# Patient Record
Sex: FEMALE | Race: BLACK OR AFRICAN AMERICAN | Employment: FULL TIME | URBAN - METROPOLITAN AREA
[De-identification: names, ages, dates, MRNs, and addresses within clinical notes are randomized per-mention and may not be internally consistent; named-entity substitution may affect disease eponyms.]

---

## 2017-03-15 PROBLEM — F32.A DEPRESSION: Status: ACTIVE | Noted: 2017-02-01

## 2017-12-28 PROBLEM — F33.9 RECURRENT DEPRESSION (HCC): Status: RESOLVED | Noted: 2017-12-28 | Resolved: 2017-12-28

## 2017-12-28 PROBLEM — F32.A DEPRESSION: Status: RESOLVED | Noted: 2017-02-01 | Resolved: 2017-12-28

## 2017-12-28 PROBLEM — F33.9 RECURRENT DEPRESSION (HCC): Status: ACTIVE | Noted: 2017-12-28

## 2018-07-05 ENCOUNTER — ANESTHESIA EVENT (OUTPATIENT)
Dept: ENDOSCOPY | Age: 53
End: 2018-07-05
Payer: COMMERCIAL

## 2018-07-05 RX ORDER — SODIUM CHLORIDE, SODIUM LACTATE, POTASSIUM CHLORIDE, CALCIUM CHLORIDE 600; 310; 30; 20 MG/100ML; MG/100ML; MG/100ML; MG/100ML
100 INJECTION, SOLUTION INTRAVENOUS CONTINUOUS
Status: CANCELLED | OUTPATIENT
Start: 2018-07-05

## 2018-07-05 RX ORDER — SODIUM CHLORIDE 0.9 % (FLUSH) 0.9 %
5-10 SYRINGE (ML) INJECTION AS NEEDED
Status: CANCELLED | OUTPATIENT
Start: 2018-07-05

## 2018-07-06 ENCOUNTER — ANESTHESIA (OUTPATIENT)
Dept: ENDOSCOPY | Age: 53
End: 2018-07-06
Payer: COMMERCIAL

## 2018-07-06 ENCOUNTER — HOSPITAL ENCOUNTER (OUTPATIENT)
Age: 53
Setting detail: OUTPATIENT SURGERY
Discharge: HOME | End: 2018-07-06
Attending: SURGERY | Admitting: SURGERY
Payer: COMMERCIAL

## 2018-07-06 VITALS
HEIGHT: 68 IN | DIASTOLIC BLOOD PRESSURE: 72 MMHG | HEART RATE: 88 BPM | OXYGEN SATURATION: 98 % | RESPIRATION RATE: 20 BRPM | SYSTOLIC BLOOD PRESSURE: 136 MMHG | BODY MASS INDEX: 25.01 KG/M2 | WEIGHT: 165 LBS | TEMPERATURE: 98.1 F

## 2018-07-06 PROCEDURE — 74011000250 HC RX REV CODE- 250: Performed by: ANESTHESIOLOGY

## 2018-07-06 PROCEDURE — 77030013991 HC SNR POLYP ENDOSC BSC -A: Performed by: SURGERY

## 2018-07-06 PROCEDURE — 74011250636 HC RX REV CODE- 250/636

## 2018-07-06 PROCEDURE — 77030011640 HC PAD GRND REM COVD -A: Performed by: SURGERY

## 2018-07-06 PROCEDURE — 88305 TISSUE EXAM BY PATHOLOGIST: CPT | Performed by: SURGERY

## 2018-07-06 PROCEDURE — 76060000032 HC ANESTHESIA 0.5 TO 1 HR: Performed by: SURGERY

## 2018-07-06 PROCEDURE — 74011000250 HC RX REV CODE- 250

## 2018-07-06 PROCEDURE — 74011250636 HC RX REV CODE- 250/636: Performed by: ANESTHESIOLOGY

## 2018-07-06 PROCEDURE — 76040000026: Performed by: SURGERY

## 2018-07-06 RX ORDER — PROPOFOL 10 MG/ML
INJECTION, EMULSION INTRAVENOUS
Status: DISCONTINUED | OUTPATIENT
Start: 2018-07-06 | End: 2018-07-06 | Stop reason: HOSPADM

## 2018-07-06 RX ORDER — PROPOFOL 10 MG/ML
INJECTION, EMULSION INTRAVENOUS AS NEEDED
Status: DISCONTINUED | OUTPATIENT
Start: 2018-07-06 | End: 2018-07-06 | Stop reason: HOSPADM

## 2018-07-06 RX ORDER — SODIUM CHLORIDE 9 MG/ML
10 INJECTION, SOLUTION INTRAVENOUS CONTINUOUS
Status: DISCONTINUED | OUTPATIENT
Start: 2018-07-06 | End: 2018-07-06 | Stop reason: HOSPADM

## 2018-07-06 RX ORDER — LIDOCAINE HYDROCHLORIDE 20 MG/ML
INJECTION, SOLUTION EPIDURAL; INFILTRATION; INTRACAUDAL; PERINEURAL AS NEEDED
Status: DISCONTINUED | OUTPATIENT
Start: 2018-07-06 | End: 2018-07-06 | Stop reason: HOSPADM

## 2018-07-06 RX ORDER — SODIUM CHLORIDE, SODIUM LACTATE, POTASSIUM CHLORIDE, CALCIUM CHLORIDE 600; 310; 30; 20 MG/100ML; MG/100ML; MG/100ML; MG/100ML
100 INJECTION, SOLUTION INTRAVENOUS CONTINUOUS
Status: DISCONTINUED | OUTPATIENT
Start: 2018-07-06 | End: 2018-07-06 | Stop reason: HOSPADM

## 2018-07-06 RX ADMIN — LIDOCAINE HYDROCHLORIDE 40 MG: 20 INJECTION, SOLUTION EPIDURAL; INFILTRATION; INTRACAUDAL; PERINEURAL at 09:10

## 2018-07-06 RX ADMIN — SODIUM CHLORIDE, SODIUM LACTATE, POTASSIUM CHLORIDE, AND CALCIUM CHLORIDE 100 ML/HR: 600; 310; 30; 20 INJECTION, SOLUTION INTRAVENOUS at 08:53

## 2018-07-06 RX ADMIN — PROPOFOL 200 MCG/KG/MIN: 10 INJECTION, EMULSION INTRAVENOUS at 09:00

## 2018-07-06 RX ADMIN — FAMOTIDINE 20 MG: 10 INJECTION, SOLUTION INTRAVENOUS at 08:57

## 2018-07-06 RX ADMIN — PROPOFOL 60 MG: 10 INJECTION, EMULSION INTRAVENOUS at 09:11

## 2018-07-06 NOTE — DISCHARGE INSTRUCTIONS
Gastrointestinal Colonoscopy/Flexible Sigmoidoscopy - Lower Exam Discharge Instructions  1. Call Dr. Bhavik Grimes for any problems or questions. 2. Contact the doctors office for follow up appointment as directed  3. Medication may cause drowsiness for several hours, therefore, do not drive or operate machinery for remainder of the day. 4. No alcohol today. 5. Ordinarily, you may resume regular diet and activity after exam unless otherwise specified by your physician. 6. Because of air put into your colon during exam, you may experience some abdominal distension, relieved by the passage of gas, for several hours. 7. Contact your physician if you have any of the following:  a. Excessive amount of bleeding - large amount when having a bowel movement. Occasional specks of blood with bowel movement would not be unusual.  b. Severe abdominal pain  c. Fever or Chills  8. Polyp Removal -   a. No Aspirin, Advil, Aleve, Nuprin, Ibuprofen, or medications that contain these drugs for 2 weeks. Any additional instructions: Follow up with polyp results      Instructions given to Maya Pickett and other family members.

## 2018-07-06 NOTE — IP AVS SNAPSHOT
[facility] 
 
 
 [address] [facility] [address] [facility] 
[phone] Patient: Valerie Brower MRN: [id number] :1965 About your hospitalization You were admitted on:  2018 You last received care in the:  [facility] You were discharged on:  2018 Why you were hospitalized Your primary diagnosis was:  Not on File Follow-up Information None Your Scheduled Appointments 2018  8:15 AM EDT  
LAB with [facility] [place] LAB RESOURCE [address] [facility] ([facility] [place] [address] [facility]) [address] [address]  
[phone] Discharge Orders None A check babar indicates which time of day the medication should be taken. My Medications ASK your doctor about these medications Instructions Each Dose to Equal  
 Morning Noon Evening Bedtime  
 albuterol 90 mcg/actuation inhaler Commonly known as:  PROAIR HFA Your last dose was: Your next dose is:    
   
   
 2 puffs qid prn  
     
   
   
   
  
 clindamycin 1 % external solution Commonly known as:  CLEOCIN T Your last dose was: Your next dose is:    
   
   
 use thin film on affected area  
     
   
   
   
  
 eflornithine 13.9 % topical cream  
Commonly known as:  "[employer]" Your last dose was: Your next dose is:    
   
   
 Apply 1 Each to affected area two (2) times a day. apply thin layer to affected areas space application by 8 hour 1 Each  
    
   
   
   
  
 fluticasone 50 mcg/actuation nasal spray Commonly known as:  [employer] Your last dose was: Your next dose is:    
   
   
 1 Spray by Both Nostrils route daily. 1 Spray  
    
   
   
   
  
 fluticasone-vilanterol 200-25 mcg/dose inhaler Commonly known as:  BREO ELLIPTA Your last dose was: Your next dose is: 1 inhalation daily, rinse mouth after use  
     
   
   
   
  
 montelukast 10 mg tablet Commonly known as:  SINGULAIR Your last dose was: Your next dose is:    
   
   
 1 po q hs  
     
   
   
   
  
 venlafaxine 37.5 mg tablet Commonly known as:  [facility] Your last dose was: Your next dose is: Take 1 Tab by mouth daily. Indications: VASOMOTOR SYMPTOMS ASSOCIATED WITH MENOPAUSE  
 37.5 mg  
    
   
   
   
  
  
  
  
Discharge Instructions Gastrointestinal Colonoscopy/Flexible Sigmoidoscopy - Lower Exam Discharge Instructions 1. Call Dr. Sherron Bolanos for any problems or questions. 2. Contact the doctors office for follow up appointment as directed 3. Medication may cause drowsiness for several hours, therefore, do not drive or operate machinery for remainder of the day. 4. No alcohol today. 5. Ordinarily, you may resume regular diet and activity after exam unless otherwise specified by your physician. 6. Because of air put into your colon during exam, you may experience some abdominal distension, relieved by the passage of gas, for several hours. 7. Contact your physician if you have any of the following: 
a. Excessive amount of bleeding  large amount when having a bowel movement. Occasional specks of blood with bowel movement would not be unusual. 
b. Severe abdominal pain 
c. Fever or Chills 8. Polyp Removal   
a. No Aspirin, Advil, Aleve, Nuprin, Ibuprofen, or medications that contain these drugs for 2 weeks. Any additional instructions: Follow up with polyp results Instructions given to Xiomara Wilton and other family members. Introducing [facility]! [facility] introduces [employer] patient portal. Now you can access parts of your medical record, email your doctor's office, and request medication refills online. 1. In your internet browser, go to [email or web address] 2. Click on the First Time User? Click Here link in the Sign In box. You will see the New Member Sign Up page. 3. Enter your [employer] Access Code exactly as it appears below. You will not need to use this code after youve completed the sign-up process. If you do not sign up before the expiration date, you must request a new code. · [employer] Access Code: [email or web address] Expires: 10/1/2018  8:22 AM 
 
4. Enter the last four digits of your Social Security Number (xxxx) and Date of Birth (mm/dd/yyyy) as indicated and click Submit. You will be taken to the next sign-up page. 5. Create a [employer] ID. This will be your [employer] login ID and cannot be changed, so think of one that is secure and easy to remember. 6. Create a [employer] password. You can change your password at any time. 7. Enter your Password Reset Question and Answer. This can be used at a later time if you forget your password. 8. Enter your e-mail address. You will receive e-mail notification when new information is available in [address] 9. Click Sign Up. You can now view and download portions of your medical record. 10. Click the Download Summary menu link to download a portable copy of your medical information. If you have questions, please visit the Frequently Asked Questions section of the [employer] website. Remember, [employer] is NOT to be used for urgent needs. For medical emergencies, dial 911. Now available from your iPhone and Android! Introducing Adolfo Farrell As a [facility] patient, I wanted to make you aware of our electronic visit tool called Adolfo Farrell. [facility] 24/7 allows you to connect within minutes with a medical provider 24 hours a day, seven days a week via a mobile device or tablet or logging into a secure website from your computer. You can access Adolfo Farrell from anywhere in the [place].  
 
A virtual visit might be right for you when you have a simple condition and feel like you just dont want to get out of bed, or cant get away from work for an appointment, when your regular [facility] provider is not available (evenings, weekends or holidays), or when youre out of town and need minor care. Electronic visits cost only $49 and if the [employer] [facility] 24/7 provider determines a prescription is needed to treat your condition, one can be electronically transmitted to a nearby pharmacy*. Please take a moment to enroll today if you have not already done so. The enrollment process is free and takes just a few minutes. To enroll, please download the [employer] kayleen to your tablet or phone, or visit [email or web address] to enroll on your computer. And, as an [address] [place] [address] patient with a [employer] account, the results of your visits will be scanned into your electronic medical record and your primary care provider will be able to view the scanned results. We urge you to continue to see your regular [facility] provider for your ongoing medical care. And while your primary care provider may not be the one available when you seek a [employer] On[employer] virtual visit, the peace of mind you get from getting a real diagnosis real time can be priceless. For more information on Adolfo Ajalinetana, view our Frequently Asked Questions (FAQs) at [email or web address]. Sincerely, 
 
Ashlie Ware MD 
Chief Medical Officer [employer] *:  certain medications cannot be prescribed via Adolfo Ajalinetana Providers Seen During Your Hospitalization Provider Specialty Primary office phone Casi Osman MD General Surgery [phone] Your Primary Care Physician (PCP) Primary Care Physician Office Phone Office Fax Via Stuart Mendoza [address] [place] [address] [phone] You are allergic to the following No active allergies Recent Documentation Height Weight Breastfeeding? BMI OB Status Smoking Status 1.727 m 74.8 kg No 25.09 kg/m2 Hysterectomy Never Smoker Emergency Contacts Name Discharge Info Relation Home Work Mobile Sagar Shore  Spouse [0] [phone] Randa Roy  Mother [14]   [phone] Patient Belongings The following personal items are in your possession at time of discharge: 
  Dental Appliances: None  Visual Aid: Contacts Please provide this summary of care documentation to your next provider. Signatures-by signing, you are acknowledging that this After Visit Summary has been reviewed with you and you have received a copy. Patient Signature:  ____________________________________________________________ Date:  ____________________________________________________________  
  
[facility] Provider Signature:  ____________________________________________________________ Date:  ____________________________________________________________

## 2018-07-06 NOTE — ANESTHESIA PREPROCEDURE EVALUATION
Anesthetic History   No history of anesthetic complications            Review of Systems / Medical History  Patient summary reviewed and pertinent labs reviewed    Pulmonary            Asthma : well controlled       Neuro/Psych   Within defined limits           Cardiovascular    Hypertension: well controlled              Exercise tolerance: >4 METS     GI/Hepatic/Renal  Within defined limits              Endo/Other  Within defined limits           Other Findings   Comments: depression           Physical Exam    Airway  Mallampati: II  TM Distance: 4 - 6 cm  Neck ROM: normal range of motion   Mouth opening: Normal     Cardiovascular  Regular rate and rhythm,  S1 and S2 normal,  no murmur, click, rub, or gallop  Rhythm: regular  Rate: normal         Dental  No notable dental hx       Pulmonary  Breath sounds clear to auscultation               Abdominal  GI exam deferred       Other Findings            Anesthetic Plan    ASA: 2  Anesthesia type: total IV anesthesia          Induction: Intravenous  Anesthetic plan and risks discussed with: Patient

## 2018-07-06 NOTE — H&P
Colonoscopy History and Physical      Patient: Katarzyna Cruz    Physician: Yobani Tineo MD    Referring Physician: Bossman Chadwick MD    Chief Complaint: For colonoscopy    History of Present Illness: Pt presents for colonoscopy. Initial screening study; did have colo 25 or so years ago. History:  Past Medical History:   Diagnosis Date    Asthma     Depression 02/2017    Hives     Hx of esophagogastroduodenoscopy 2007    secondary to dysphagia    Hypertension     no medication at this time    Reactive airway disease     Urticaria      Past Surgical History:   Procedure Laterality Date    HX HYSTERECTOMY  2012    HX TONSILLECTOMY  1973      Social History     Social History    Marital status:      Spouse name: N/A    Number of children: N/A    Years of education: N/A     Social History Main Topics    Smoking status: Never Smoker    Smokeless tobacco: Never Used    Alcohol use 0.0 oz/week     0 Standard drinks or equivalent per week      Comment: occasional     Drug use: None    Sexual activity: Not Asked     Other Topics Concern    None     Social History Narrative    No known toxic industrial or environmental exposure. She has lived in [place] and [place]. She is employed as a teacher in [place]. She is  and resides in [place] Family History   Problem Relation Age of Onset    Lung Disease Neg Hx        Medications:   Prior to Admission medications    Medication Sig Start Date End Date Taking?  Authorizing Provider   fluticasone-vilanterol (BREO ELLIPTA) 200-25 mcg/dose inhaler 1 inhalation daily, rinse mouth after use  Patient taking differently: 1 inhalation daily, rinse mouth after use-not currently using-7-3-18 7/3/18  Yes Marsha Husain NP   clindamycin (CLEOCIN T) 1 % external solution use thin film on affected area 7/3/18  Yes Marsha Husain NP   fluticasone (FLONASE) 50 mcg/actuation nasal spray 1 Spray by Both Nostrils route daily. 7/3/18  Yes Sadia Cruz NP   [facility]erol [facility]) 90 mcg/actuation inhaler 2 puffs qid prn 7/3/18  Yes Sadia Cruz NP   [facility]) 13.9 % topical cream Apply 1 Each to affected area two (2) times a day. apply thin layer to affected areas space application by 8 hour 6/9/17  Yes Sadia Cruz NP   montelukast (SINGULAIR) 10 mg tablet 1 po q hs  Patient taking differently: Take 10 mg by mouth nightly. 1 po q hs 7/3/18  Yes Sadia Cruz NP   venlafaxine [facility]) 37.5 mg tablet Take 1 Tab by mouth daily. Indications: VASOMOTOR SYMPTOMS ASSOCIATED WITH MENOPAUSE  Patient taking differently: Take 37.5 mg by mouth daily. Will start after procedure-7-6-18  Indications: VASOMOTOR SYMPTOMS ASSOCIATED WITH MENOPAUSE 5/16/18   Sadia Cruz NP       Allergies: No Known Allergies    Physical Exam:     Vital Signs:   Visit Vitals    /79    Pulse 90    Temp 98 °F (36.7 °C)    Resp 18    Ht 5' 8\" (1.727 m)    Wt 165 lb (74.8 kg)    SpO2 100%    Breastfeeding No    BMI 25.09 kg/m2     . General: in NAD      Heart: regular   Lungs: unlabored   Abdominal: soft   Neurological: grossly normal        Findings/Diagnosis: Screening for colorectal cancer     Plan of Care/Planned Procedure: Colonoscopy. Risks of endoscopy include  bleeding, perforation. They understand and agree to proceed.       Signed:  Albert Diaz MD   7/6/2018

## 2018-07-06 NOTE — PROCEDURES
Procedure in Detail:  Informed consent was obtained for the procedure. The patient was placed in the left lateral decubitus position and sedation was induced by anesthesia. The [id number] was inserted into the rectum and advanced under direct vision to the cecum, which was identified by the ileocecal valve and appendiceal orifice. The quality of the colonic preparation was adequate. A careful inspection was made as the colonoscope was withdrawn, including a retroflexed view of the rectum; findings and interventions are described below. Appropriate photodocumentation was obtained. Findings:   Rectum:   Normal  Sigmoid:   Normal  Descending Colon:   Normal  Transverse Colon:   Normal  Ascending Colon:     - Protruding lesions:     -Sessile Polyp(s) size 10 and 15 mm removed by polypectomy (snare cautery) and larger lesion on ascending side of IC valve with mild bleeding, site cauterized  Cecum:   Normal          Specimens: Specimens were collected and sent to pathology. Complications: None; patient tolerated the procedure well. \    EBL - minimal    Recommendations:   - Await pathology. - If adenoma is present, repeat colonoscopy in 3 years.      Signed By: Casi Osman MD                        July 6, 2018

## 2018-07-06 NOTE — ANESTHESIA POSTPROCEDURE EVALUATION
Post-Anesthesia Evaluation and Assessment    Patient: Elisa Quigley MRN: [id number]  SSN: [id number]    YOB: 1965  Age: 47 y.o. Sex: female       Cardiovascular Function/Vital Signs  Visit Vitals    /79    Pulse (!) 104    Temp 36.7 °C (98.1 °F)    Resp 26    Ht 5' 8\" (1.727 m)    Wt 74.8 kg (165 lb)    SpO2 100%    Breastfeeding No    BMI 25.09 kg/m2       Patient is status post total IV anesthesia anesthesia for Procedure(s):  COLONOSCOPY  BMI 27  ENDOSCOPIC POLYPECTOMY. Nausea/Vomiting: None    Postoperative hydration reviewed and adequate. Pain:  Pain Scale 1: Visual (07/06/18 0947)  Pain Intensity 1: 0 (07/06/18 0947)   Managed    Neurological Status: At baseline    Mental Status and Level of Consciousness: Arousable    Pulmonary Status:   O2 Device: Nasal cannula (07/06/18 0947)   Adequate oxygenation and airway patent    Complications related to anesthesia: None    Post-anesthesia assessment completed.  No concerns    Signed By: Rachael Salvador MD     July 6, 2018

## 2018-08-01 ENCOUNTER — HOSPITAL ENCOUNTER (OUTPATIENT)
Dept: ULTRASOUND IMAGING | Age: 53
Discharge: HOME | End: 2018-08-01
Payer: COMMERCIAL

## 2018-08-01 DIAGNOSIS — R94.6 ABNORMAL THYROID FUNCTION TEST: ICD-10-CM

## 2018-08-01 PROCEDURE — 76536 US EXAM OF HEAD AND NECK: CPT

## 2018-08-01 NOTE — PROGRESS NOTES
Patient notified of thyroid US results. Per JDE, orders placed for thyroid biopsy of thryoid mass and referral placed to Endocrinology for further evaluation.

## 2018-08-14 ENCOUNTER — HOSPITAL ENCOUNTER (OUTPATIENT)
Dept: ULTRASOUND IMAGING | Age: 53
Discharge: HOME | End: 2018-08-14
Payer: COMMERCIAL

## 2018-08-14 VITALS
DIASTOLIC BLOOD PRESSURE: 76 MMHG | OXYGEN SATURATION: 100 % | HEART RATE: 69 BPM | WEIGHT: 165 LBS | RESPIRATION RATE: 20 BRPM | SYSTOLIC BLOOD PRESSURE: 144 MMHG | BODY MASS INDEX: 25.01 KG/M2 | HEIGHT: 68 IN | TEMPERATURE: 98 F

## 2018-08-14 DIAGNOSIS — E07.9 THYROID MASS: ICD-10-CM

## 2018-08-14 PROCEDURE — 74011250636 HC RX REV CODE- 250/636: Performed by: RADIOLOGY

## 2018-08-14 PROCEDURE — 88173 CYTOPATH EVAL FNA REPORT: CPT

## 2018-08-14 PROCEDURE — 10022 US GUIDE FINE NDL ASP THYROID: CPT

## 2018-08-14 PROCEDURE — 76942 ECHO GUIDE FOR BIOPSY: CPT

## 2018-08-14 PROCEDURE — 74011000250 HC RX REV CODE- 250: Performed by: RADIOLOGY

## 2018-08-14 PROCEDURE — 88305 TISSUE EXAM BY PATHOLOGIST: CPT

## 2018-08-14 RX ORDER — LIDOCAINE HYDROCHLORIDE 10 MG/ML
1-10 INJECTION INFILTRATION; PERINEURAL ONCE
Status: COMPLETED | OUTPATIENT
Start: 2018-08-14 | End: 2018-08-14

## 2018-08-14 RX ADMIN — LIDOCAINE HYDROCHLORIDE 2 ML: 10 INJECTION, SOLUTION INFILTRATION; PERINEURAL at 12:04

## 2018-08-14 RX ADMIN — SODIUM BICARBONATE 2 ML: 0.2 INJECTION, SOLUTION INTRAVENOUS at 12:05

## 2018-08-14 NOTE — DISCHARGE INSTRUCTIONS
[address]  Department of Interventional Radiology  [facility]  [phone] Office  [phone] Fax    BIOPSY DISCHARGE INSTRUCTIONS    General Instructions:     A biopsy is the removal of a small piece of tissue for microscopic examination or testing. Healthy tissue can be obtained for the purpose of tissue-type matching for transplants. Unhealthy tissues are more commonly biopsied to diagnose disease. Lung Biopsy:     A needle lung biopsy is performed when there is a mass discovered in the lung area. The most serious risk is infection, bleeding, and pneumothorax (a collapsed lung). Signs of pneumothorax include shortness of breath, rapid heart rate, and blueness of the skin. If any of these occur, call 911. Liver Biopsy: This test helps detect cancer, infections, and the cause of an enlargement of the liver or elevated liver enzymes. It also helps to diagnose a number of liver diseases. The pain associated with the procedure may be felt in the shoulder. The risks include internal bleeding, pneumothorax, and injury to the surrounding organs. Renal Biopsy: This procedure is sometimes done to evaluate a transplanted kidney. It is also used to evaluate unexplained decrease in kidney function, blood, or protein in the urine. You may see bright red blood in the urine the first 24 hours after the test. If the bleeding lasts longer, you need to call your doctor. There is a risk of infection and bleeding into the muscle, which may cause soreness. Spinal Biopsy: This test is sometimes done in conjunction with other procedures. Your back will be sore, as we are taking a small sample of bone, which is slightly more difficult to sample than tissue. General Biopsy:     A mass can grow in any area of the body, and we are taking a specimen as ordered by your doctor. The risks are the same.  They include bleeding, pain, and infection. Home Care Instructions: You may resume your regular diet and medication regimen. Do not drink alcohol, drive, or make any important legal decisions in the next 24 hours. Do not lift anything heavier than a gallon of milk until the soreness goes away. You may use over the counter acetaminophen or ibuprofen for the soreness. You may apply an ice pack to the affected area for 20-30 minutes at time for the first 24 hours. After that, you may apply a heat pack. Call If: You should call your Physician and/or the Radiology Nurse if you have any questions or concerns about the biopsy site. Call if you should have increased pain, fever, redness, drainage, or bleeding more than a small spot on the bandage. Follow-Up Instructions: Please see your ordering doctor as he/she has requested. Interventional Radiology General Nurse Discharge    After general anesthesia or intravenous sedation, for 24 hours or while taking prescription Narcotics:  · Limit your activities  · Do not drive and operate hazardous machinery  · Do not make important personal or business decisions  · Do  not drink alcoholic beverages  · If you have not urinated within 8 hours after discharge, please contact your surgeon on call. * Please give a list of your current medications to your Primary Care Provider. * Please update this list whenever your medications are discontinued, doses are     changed, or new medications (including over-the-counter products) are added. * Please carry medication information at all times in case of emergency situations. These are general instructions for a healthy lifestyle:    No smoking/ No tobacco products/ Avoid exposure to second hand smoke  Surgeon General's Warning:  Quitting smoking now greatly reduces serious risk to your health.     Obesity, smoking, and sedentary lifestyle greatly increases your risk for illness  A healthy diet, regular physical exercise & weight monitoring are important for maintaining a healthy lifestyle    You may be retaining fluid if you have a history of heart failure or if you experience any of the following symptoms:  Weight gain of 3 pounds or more overnight or 5 pounds in a week, increased swelling in our hands or feet or shortness of breath while lying flat in bed. Please call your doctor as soon as you notice any of these symptoms; do not wait until your next office visit. Recognize signs and symptoms of STROKE:  F-face looks uneven    A-arms unable to move or move unevenly    S-speech slurred or non-existent    T-time-call 911 as soon as signs and symptoms begin-DO NOT go       Back to bed or wait to see if you get better-TIME IS BRAIN. To Reach Us: If you have any questions about your procedure, please call the Interventional Radiology department at [phone]. After business hours (5pm) and weekends, call the answering service at [phone] and ask for the Radiologist on call to be paged. Si tiene Preguntas acerca del procedimiento, por favor llame al departamento de Radiología Intervencional al [phone]. Después de horas de oficina (5 pm) y los fines de [place], llamar al Fidelia Paul de suzannaamadas al [phone] y pregunte por el Radiologo de [facility].          Patient Signature:  Date: 8/14/2018  Discharging Nurse: Clement Cervantes RN

## 2018-08-14 NOTE — IP AVS SNAPSHOT
303 [facility] 
 
 
 [address] 
[phone] Patient: Nivia De Leon MRN: [id number] :1965 About your hospitalization You were admitted on:  2018 You last received care in the:  [facility] RADIOLOGY ULTRASOUND You were discharged on:  2018 Why you were hospitalized Your primary diagnosis was:  Not on File Follow-up Information None Your Scheduled Appointments 2018  1:45 PM EDT Office Visit with Garima Pham NP 1333 [facility] (\[facility]\"" 1051 [facility]) 101 [facility] ([facility]) [facility] 89  
[phone]   3:15 PM EDT THYROID BIOPSY with Nancy Patel MD  
[facility] ENDOCRINOLOGY) 2 [address] [place] [address] [phone] Discharge Orders None A check babar indicates which time of day the medication should be taken. My Medications ASK your doctor about these medications Instructions Each Dose to Equal  
 Morning Noon Evening Bedtime  
 albuterol 90 mcg/actuation inhaler Commonly known as:  PROAIR HFA Your last dose was: Your next dose is:    
   
   
 2 puffs qid prn  
     
   
   
   
  
 clindamycin 1 % external solution Commonly known as:  CLEOCIN T Your last dose was: Your next dose is:    
   
   
 use thin film on affected area  
     
   
   
   
  
 eflornithine 13.9 % topical cream  
Commonly known as:  NebuAd Your last dose was: Your next dose is:    
   
   
 Apply 1 Each to affected area two (2) times a day. apply thin layer to affected areas space application by 8 hour 1 Each  
    
   
   
   
  
 fluticasone 50 mcg/actuation nasal spray Commonly known as:  Lella Solum Your last dose was: Your next dose is: 1 [place] by Both Nostrils route daily. 1 Spray  
    
   
   
   
  
 fluticasone-vilanterol 200-25 mcg/dose inhaler Commonly known as:  BREO ELLIPTA Your last dose was: Your next dose is:    
   
   
 1 inhalation daily, rinse mouth after use  
     
   
   
   
  
 montelukast 10 mg tablet Commonly known as:  SINGULAIR Your last dose was: Your next dose is:    
   
   
 1 po q hs  
     
   
   
   
  
 venlafaxine 37.5 mg tablet Commonly known as:  [facility] Your last dose was: Your next dose is: Take 1 Tab by mouth daily. Indications: VASOMOTOR SYMPTOMS ASSOCIATED WITH MENOPAUSE  
 37.5 mg  
    
   
   
   
  
  
  
  
Discharge Instructions [address] Department of Interventional Radiology [facility] 
[phone] Office 
[phone] Fax BIOPSY DISCHARGE INSTRUCTIONS General Instructions: A biopsy is the removal of a small piece of tissue for microscopic examination or testing. Healthy tissue can be obtained for the purpose of tissue-type matching for transplants. Unhealthy tissues are more commonly biopsied to diagnose disease. Lung Biopsy: A needle lung biopsy is performed when there is a mass discovered in the lung area. The most serious risk is infection, bleeding, and pneumothorax (a collapsed lung). Signs of pneumothorax include shortness of breath, rapid heart rate, and blueness of the skin. If any of these occur, call 911. Liver Biopsy: This test helps detect cancer, infections, and the cause of an enlargement of the liver or elevated liver enzymes. It also helps to diagnose a number of liver diseases. The pain associated with the procedure may be felt in the shoulder. The risks include internal bleeding, pneumothorax, and injury to the surrounding organs. Renal Biopsy: This procedure is sometimes done to evaluate a transplanted kidney. It is also used to evaluate unexplained decrease in kidney function, blood, or protein in the urine. You may see bright red blood in the urine the first 24 hours after the test. If the bleeding lasts longer, you need to call your doctor. There is a risk of infection and bleeding into the muscle, which may cause soreness. Spinal Biopsy: This test is sometimes done in conjunction with other procedures. Your back will be sore, as we are taking a small sample of bone, which is slightly more difficult to sample than tissue. General Biopsy: 
   A mass can grow in any area of the body, and we are taking a specimen as ordered by your doctor. The risks are the same. They include bleeding, pain, and infection. Home Care Instructions: You may resume your regular diet and medication regimen. Do not drink alcohol, drive, or make any important legal decisions in the next 24 hours. Do not lift anything heavier than a gallon of milk until the soreness goes away. You may use over the counter acetaminophen or ibuprofen for the soreness. You may apply an ice pack to the affected area for 20-30 minutes at time for the first 24 hours. After that, you may apply a heat pack. Call If: You should call your Physician and/or the Radiology Nurse if you have any questions or concerns about the biopsy site. Call if you should have increased pain, fever, redness, drainage, or bleeding more than a small spot on the bandage. Follow-Up Instructions: Please see your ordering doctor as he/she has requested. Interventional Radiology General Nurse Discharge After general anesthesia or intravenous sedation, for 24 hours or while taking prescription Narcotics: · Limit your activities · Do not drive and operate hazardous machinery · Do not make important personal or business decisions · Do  not drink alcoholic beverages · If you have not urinated within 8 hours after discharge, please contact your surgeon on call. * Please give a list of your current medications to your Primary Care Provider. * Please update this list whenever your medications are discontinued, doses are 
   changed, or new medications (including over-the-counter products) are added. * Please carry medication information at all times in case of emergency situations. These are general instructions for a healthy lifestyle: No smoking/ No tobacco products/ Avoid exposure to second hand smoke Surgeon General's Warning:  Quitting smoking now greatly reduces serious risk to your health. Obesity, smoking, and sedentary lifestyle greatly increases your risk for illness A healthy diet, regular physical exercise & weight monitoring are important for maintaining a healthy lifestyle You may be retaining fluid if you have a history of heart failure or if you experience any of the following symptoms:  Weight gain of 3 pounds or more overnight or 5 pounds in a week, increased swelling in our hands or feet or shortness of breath while lying flat in bed. Please call your doctor as soon as you notice any of these symptoms; do not wait until your next office visit. Recognize signs and symptoms of STROKE: 
F-face looks uneven A-arms unable to move or move unevenly S-speech slurred or non-existent T-time-call 911 as soon as signs and symptoms begin-DO NOT go Back to bed or wait to see if you get better-TIME IS BRAIN. To Reach Us: If you have any questions about your procedure, please call the Interventional Radiology department at [phone]. After business hours (5pm) and weekends, call the answering service at [phone] and ask for the Radiologist on call to be paged. Si tiene Preguntas acerca del procedimiento, por favor llame al departamento de Radiología Intervencional al [phone].  Después de horas de oficina (5 pm) y los fines de [place], llamar al Cyndi Dirk de llamadas al [phone] y pregunte por el Radiologo de [place]. Patient Signature: 
Date: 8/14/2018 Discharging Nurse: Bridgett Diggs RN Introducing [facility] & HE[facility] SERVICES! Sonido Pagan introduces Picomize patient portal. Now you can access parts of your medical record, email your doctor's office, and request medication refills online. 1. In your internet browser, go to [email or web address] 2. Click on the First Time User? Click Here link in the Sign In box. You will see the New Member Sign Up page. 3. Enter your Picomize Access Code exactly as it appears below. You will not need to use this code after youve completed the sign-up process. If you do not sign up before the expiration date, you must request a new code. · Picomize Access Code: [email or web address] Expires: 10/1/2018  8:22 AM 
 
4. Enter the last four digits of your Social Security Number (xxxx) and Date of Birth (mm/dd/yyyy) as indicated and click Submit. You will be taken to the next sign-up page. 5. Create a Picomize ID. This will be your Picomize login ID and cannot be changed, so think of one that is secure and easy to remember. 6. Create a Picomize password. You can change your password at any time. 7. Enter your Password Reset Question and Answer. This can be used at a later time if you forget your password. 8. Enter your e-mail address. You will receive e-mail notification when new information is available in [address] 9. Click Sign Up. You can now view and download portions of your medical record. 10. Click the Download Summary menu link to download a portable copy of your medical information. If you have questions, please visit the Frequently Asked Questions section of the Picomize website. Remember, Picomize is NOT to be used for urgent needs. For medical emergencies, dial 911. Now available from your iPhone and Android! Introducing Adolfo Farrell As a [facility] patient, I wanted to make you aware of our electronic visit tool called Adolfo Farrell. [facility] 24/7 allows you to connect within minutes with a medical provider 24 hours a day, seven days a week via a mobile device or tablet or logging into a secure website from your computer. You can access Adolfo Farrell from anywhere in the [place]. A virtual visit might be right for you when you have a simple condition and feel like you just dont want to get out of bed, or cant get away from work for an appointment, when your regular [facility] provider is not available (evenings, weekends or holidays), or when youre out of town and need minor care. Electronic visits cost only $49 and if the [facility] 24/7 provider determines a prescription is needed to treat your condition, one can be electronically transmitted to a nearby pharmacy*. Please take a moment to enroll today if you have not already done so. The enrollment process is free and takes just a few minutes. To enroll, please download the [facility] 24/Bluetest akyleen to your tablet or phone, or visit [email or web address] to enroll on your computer. And, as an [address] [place] [address] patient with a [email or web address] account, the results of your visits will be scanned into your electronic medical record and your primary care provider will be able to view the scanned results. We urge you to continue to see your regular [facility] provider for your ongoing medical care. And while your primary care provider may not be the one available when you seek a Adolfo Farrell virtual visit, the peace of mind you get from getting a real diagnosis real time can be priceless. For more information on Adolfo Farrell, view our Frequently Asked Questions (FAQs) at [email or web address]. Sincerely, 
 
Jose M Magallon MD 
Chief Medical Officer [employer] *:  certain medications cannot be prescribed via Adolfo Farrell Unresulted Labs-Please follow up with your PCP about these lab tests Order Current Status US GUIDE BX THYROID PERC NDL In process Your Primary Care Physician (PCP) Primary Care Physician Office Phone Office Fax [address] [place] T [phone] You are allergic to the following No active allergies Recent Documentation Height Weight BMI OB Status Smoking Status 1.727 m 74.8 kg 25.09 kg/m2 Hysterectomy Never Smoker Emergency Contacts Name Discharge Info Relation Home Work Mobile Sagar Shore  Spouse [3] [phone] Lisa Carrera  Mother [14]   [phone] Patient Belongings The following personal items are in your possession at time of discharge: 
                             
 
  
  
 Please provide this summary of care documentation to your next provider. Signatures-by signing, you are acknowledging that this After Visit Summary has been reviewed with you and you have received a copy. Patient Signature:  ____________________________________________________________ Date:  ____________________________________________________________  
  
Aloma Snellen Provider Signature:  ____________________________________________________________ Date:  ____________________________________________________________

## 2018-08-16 PROBLEM — E05.00 GRAVES' DISEASE: Status: ACTIVE | Noted: 2018-08-16

## 2018-08-16 PROBLEM — E04.2 MULTINODULAR GOITER: Status: ACTIVE | Noted: 2018-08-16

## 2018-08-16 PROBLEM — R00.2 PALPITATIONS: Status: ACTIVE | Noted: 2018-08-16

## 2018-08-16 PROBLEM — E05.00 GRAVES' OPHTHALMOPATHY: Status: ACTIVE | Noted: 2018-08-16

## 2018-08-20 PROBLEM — C73 PAPILLARY THYROID CARCINOMA (HCC): Status: ACTIVE | Noted: 2018-08-20

## 2018-08-29 ENCOUNTER — HOSPITAL ENCOUNTER (OUTPATIENT)
Dept: ULTRASOUND IMAGING | Age: 53
Discharge: HOME | End: 2018-08-29
Attending: INTERNAL MEDICINE
Payer: COMMERCIAL

## 2018-08-29 DIAGNOSIS — C73 PAPILLARY THYROID CARCINOMA (HCC): ICD-10-CM

## 2018-08-29 PROCEDURE — 76536 US EXAM OF HEAD AND NECK: CPT

## 2018-09-20 ENCOUNTER — HOSPITAL ENCOUNTER (OUTPATIENT)
Dept: ULTRASOUND IMAGING | Age: 53
Discharge: HOME | End: 2018-09-20
Attending: INTERNAL MEDICINE
Payer: COMMERCIAL

## 2018-09-20 VITALS
SYSTOLIC BLOOD PRESSURE: 153 MMHG | HEART RATE: 84 BPM | OXYGEN SATURATION: 100 % | BODY MASS INDEX: 25.76 KG/M2 | DIASTOLIC BLOOD PRESSURE: 81 MMHG | RESPIRATION RATE: 16 BRPM | TEMPERATURE: 97.9 F | WEIGHT: 170 LBS | HEIGHT: 68 IN

## 2018-09-20 DIAGNOSIS — C73 PAPILLARY THYROID CARCINOMA (HCC): ICD-10-CM

## 2018-09-20 PROCEDURE — 76942 ECHO GUIDE FOR BIOPSY: CPT

## 2018-09-20 PROCEDURE — 84432 ASSAY OF THYROGLOBULIN: CPT

## 2018-09-20 PROCEDURE — 88173 CYTOPATH EVAL FNA REPORT: CPT

## 2018-09-20 PROCEDURE — 74011250636 HC RX REV CODE- 250/636: Performed by: PHYSICIAN ASSISTANT

## 2018-09-20 PROCEDURE — 74011000250 HC RX REV CODE- 250: Performed by: PHYSICIAN ASSISTANT

## 2018-09-20 PROCEDURE — 38505 NEEDLE BIOPSY LYMPH NODES: CPT

## 2018-09-20 PROCEDURE — 88305 TISSUE EXAM BY PATHOLOGIST: CPT

## 2018-09-20 RX ORDER — LIDOCAINE HYDROCHLORIDE 10 MG/ML
1-20 INJECTION INFILTRATION; PERINEURAL ONCE
Status: COMPLETED | OUTPATIENT
Start: 2018-09-20 | End: 2018-09-20

## 2018-09-20 RX ADMIN — SODIUM BICARBONATE 1 ML: 0.2 INJECTION, SOLUTION INTRAVENOUS at 15:30

## 2018-09-20 RX ADMIN — LIDOCAINE HYDROCHLORIDE 9 ML: 10 INJECTION, SOLUTION INFILTRATION; PERINEURAL at 15:30

## 2018-09-20 NOTE — PROGRESS NOTES
discharge instructions reviewed and patient verbalized understanding. Band-aids to neck without obvious bleeding or hematoma or drainage. To be discharged home with  at this time.

## 2018-09-20 NOTE — IP AVS SNAPSHOT
[address] 
[phone] Patient: Elodia Poole MRN: [id number] :1965 About your hospitalization You were admitted on:  2018 You last received care in the:  [facility] RADIOLOGY ULTRASOUND You were discharged on:  2018 Why you were hospitalized Your primary diagnosis was:  Not on File Follow-up Information None Your Scheduled Appointments 2018  3:00 PM EST Follow Up with Rico Prajapati MD  
[facility] ENDOCRINOLOGY) [address] [facility] [address] [phone] Discharge Orders None A check babar indicates which time of day the medication should be taken. My Medications ASK your doctor about these medications Instructions Each Dose to Equal  
 Morning Noon Evening Bedtime  
 albuterol 90 mcg/actuation inhaler Commonly known as:  PROAIR HFA Your last dose was: Your next dose is:    
   
   
 2 puffs qid prn  
     
   
   
   
  
 clindamycin 1 % external solution Commonly known as:  CLEOCIN T Your last dose was: Your next dose is:    
   
   
 use thin film on affected area  
     
   
   
   
  
 eflornithine 13.9 % topical cream  
Commonly known as:  Pretty Simple Your last dose was: Your next dose is:    
   
   
 Apply 1 Each to affected area two (2) times a day. apply thin layer to affected areas space application by 8 hour 1 Each  
    
   
   
   
  
 fluticasone 50 mcg/actuation nasal spray Commonly known as:  [place] Your last dose was: Your next dose is:    
   
   
 1 Spray by Both Nostrils route daily. 1 Spray  
    
   
   
   
  
 fluticasone-vilanterol 200-25 mcg/dose inhaler Commonly known as:  BREO ELLIPTA Your last dose was: Your next dose is: 1 inhalation daily, rinse mouth after use  
     
   
   
   
  
 methIMAzole 10 mg tablet Commonly known as:  TAPAZOLE Your last dose was: Your next dose is: Take 3 Tabs by mouth daily. 30 mg  
    
   
   
   
  
 metoprolol tartrate 25 mg tablet Commonly known as:  LOPRESSOR Your last dose was: Your next dose is: Take 1 Tab by mouth two (2) times a day. 25 mg  
    
   
   
   
  
 montelukast 10 mg tablet Commonly known as:  SINGULAIR Your last dose was: Your next dose is:    
   
   
 1 po q hs Discharge Instructions [address] Department of Interventional Radiology [facility] 
[phone] Office 
[phone] Fax BIOPSY DISCHARGE INSTRUCTIONS General Instructions: A biopsy is the removal of a small piece of tissue for microscopic examination or testing. Healthy tissue can be obtained for the purpose of tissue-type matching for transplants. Unhealthy tissues are more commonly biopsied to diagnose disease. General Biopsy: 
   A mass can grow in any area of the body, and we are taking a specimen as ordered by your doctor. The risks are the same. They include bleeding, pain, and infection. Home Care Instructions: You may resume your regular diet and medication regimen. Do not drink alcohol, drive, or make any important legal decisions in the next 24 hours. Do not lift anything heavier than a gallon of milk until the soreness goes away. You may use over the counter acetaminophen or ibuprofen for the soreness. You may apply an ice pack to the affected area for 20-30 minutes at time for the first 24 hours. After that, you may apply a heat pack. Call If: You should call your Physician and/or the Radiology Nurse if you have any questions or concerns about the biopsy site.  Call if you should have increased pain, fever, redness, drainage, or bleeding more than a small spot on the bandage. Follow-Up Instructions: Please see your ordering doctor as he/she has requested. To Reach Us: If you have any questions about your procedure, please call the Interventional Radiology department at [phone]. After business hours (5pm) and weekends, call the answering service at [phone] and ask for the Radiologist on call to be paged. Interventional Radiology General Nurse Discharge After general anesthesia or intravenous sedation, for 24 hours or while taking prescription Narcotics: · Limit your activities · Do not drive and operate hazardous machinery · Do not make important personal or business decisions · Do  not drink alcoholic beverages · If you have not urinated within 8 hours after discharge, please contact your surgeon on call. * Please give a list of your current medications to your Primary Care Provider. * Please update this list whenever your medications are discontinued, doses are 
   changed, or new medications (including over-the-counter products) are added. * Please carry medication information at all times in case of emergency situations. These are general instructions for a healthy lifestyle: No smoking/ No tobacco products/ Avoid exposure to second hand smoke Surgeon General's Warning:  Quitting smoking now greatly reduces serious risk to your health. Obesity, smoking, and sedentary lifestyle greatly increases your risk for illness A healthy diet, regular physical exercise & weight monitoring are important for maintaining a healthy lifestyle You may be retaining fluid if you have a history of heart failure or if you experience any of the following symptoms:  Weight gain of 3 pounds or more overnight or 5 pounds in a week, increased swelling in our hands or feet or shortness of breath while lying flat in bed.   Please call your doctor as soon as you notice any of these symptoms; do not wait until your next office visit. Recognize signs and symptoms of STROKE: 
F-face looks uneven A-arms unable to move or move unevenly S-speech slurred or non-existent T-time-call 911 as soon as signs and symptoms begin-DO NOT go Back to bed or wait to see if you get better-TIME IS BRAIN. Patient Signature: 
Date: 9/20/2018 Discharging Nurse: Rebeca Viera RN Introducing [facility] & HEALTH SERVICES! [employer] introduces Swink.tv patient portal. Now you can access parts of your medical record, email your doctor's office, and request medication refills online. 1. In your internet browser, go to [email or web address] 2. Click on the First Time User? Click Here link in the Sign In box. You will see the New Member Sign Up page. 3. Enter your Swink.tv Access Code exactly as it appears below. You will not need to use this code after youve completed the sign-up process. If you do not sign up before the expiration date, you must request a new code. · Swink.tv Access Code: [email or web address] Expires: 10/1/2018  8:22 AM 
 
4. Enter the last four digits of your Social Security Number (xxxx) and Date of Birth (mm/dd/yyyy) as indicated and click Submit. You will be taken to the next sign-up page. 5. Create a Swink.tv ID. This will be your Swink.tv login ID and cannot be changed, so think of one that is secure and easy to remember. 6. Create a Swink.tv password. You can change your password at any time. 7. Enter your Password Reset Question and Answer. This can be used at a later time if you forget your password. 8. Enter your e-mail address. You will receive e-mail notification when new information is available in [address] 9. Click Sign Up. You can now view and download portions of your medical record. 10. Click the Download Summary menu link to download a portable copy of your medical information. If you have questions, please visit the Frequently Asked Questions section of the MyChart website. Remember, [employer] is NOT to be used for urgent needs. For medical emergencies, dial 911. Now available from your iPhone and Android! Introducing Adolfo Farrell As a [employer] patient, I wanted to make you aware of our electronic visit tool called Adolfo Farrell. [employer] 24/7 allows you to connect within minutes with a medical provider 24 hours a day, seven days a week via a mobile device or tablet or logging into a secure website from your computer. You can access Adolfo Farrell from anywhere in the [place]. A virtual visit might be right for you when you have a simple condition and feel like you just dont want to get out of bed, or cant get away from work for an appointment, when your regular [employer] provider is not available (evenings, weekends or holidays), or when youre out of town and need minor care. Electronic visits cost only $49 and if the [employer] 24/7 provider determines a prescription is needed to treat your condition, one can be electronically transmitted to a nearby pharmacy*. Please take a moment to enroll today if you have not already done so. The enrollment process is free and takes just a few minutes. To enroll, please download the [employer] 24/7 kayleen to your tablet or phone, or visit [email or web address] to enroll on your computer. And, as an [address] [place] [address] patient with a [employer] account, the results of your visits will be scanned into your electronic medical record and your primary care provider will be able to view the scanned results. We urge you to continue to see your regular [employer] provider for your ongoing medical care.   And while your primary care provider may not be the one available when you seek a Adolfo Farrell virtual visit, the peace of mind you get from getting a real diagnosis real time can be priceless. For more information on Adolfo Farrell, view our Frequently Asked Questions (FAQs) at [email or web address]. Sincerely, 
 
Sandra Michelle MD 
Chief Medical Officer Elyse Bravo *:  certain medications cannot be prescribed via Adolfo Farrell Providers Seen During Your Hospitalization Provider Specialty Primary office phone Colt Maurer MD Endocrinology [phone] Your Primary Care Physician (PCP) Primary Care Physician Office Phone Office Fax [address] [place] T [phone] You are allergic to the following No active allergies Recent Documentation Height Weight Breastfeeding? BMI OB Status Smoking Status 1.727 m 77.1 kg No 25.85 kg/m2 Hysterectomy Never Smoker Emergency Contacts Name Discharge Info Relation Home Work Mobile Sagar Shore  Spouse [3] [phone] Kamar Jean Baptiste  Mother [14] [phone] Patient Belongings The following personal items are in your possession at time of discharge: 
     Visual Aid: Contacts, With patient Please provide this summary of care documentation to your next provider. Signatures-by signing, you are acknowledging that this After Visit Summary has been reviewed with you and you have received a copy. Patient Signature:  ____________________________________________________________ Date:  ____________________________________________________________  
  
Grisel Cora Provider Signature:  ____________________________________________________________ Date:  ____________________________________________________________

## 2018-09-20 NOTE — DISCHARGE INSTRUCTIONS
[address] [facility]  Department of Interventional Radiology  [facility]  [phone] Office  [phone] Fax    BIOPSY DISCHARGE INSTRUCTIONS    General Instructions:     A biopsy is the removal of a small piece of tissue for microscopic examination or testing. Healthy tissue can be obtained for the purpose of tissue-type matching for transplants. Unhealthy tissues are more commonly biopsied to diagnose disease. General Biopsy:     A mass can grow in any area of the body, and we are taking a specimen as ordered by your doctor. The risks are the same. They include bleeding, pain, and infection. Home Care Instructions: You may resume your regular diet and medication regimen. Do not drink alcohol, drive, or make any important legal decisions in the next 24 hours. Do not lift anything heavier than a gallon of milk until the soreness goes away. You may use over the counter acetaminophen or ibuprofen for the soreness. You may apply an ice pack to the affected area for 20-30 minutes at time for the first 24 hours. After that, you may apply a heat pack. Call If: You should call your Physician and/or the Radiology Nurse if you have any questions or concerns about the biopsy site. Call if you should have increased pain, fever, redness, drainage, or bleeding more than a small spot on the bandage. Follow-Up Instructions: Please see your ordering doctor as he/she has requested. To Reach Us: If you have any questions about your procedure, please call the Interventional Radiology department at [phone]. After business hours (5pm) and weekends, call the answering service at [phone] and ask for the Radiologist on call to be paged.      Interventional Radiology General Nurse Discharge    After general anesthesia or intravenous sedation, for 24 hours or while taking prescription Narcotics:  · Limit your activities  · Do not drive and operate hazardous machinery  · Do not make important personal or business decisions  · Do  not drink alcoholic beverages  · If you have not urinated within 8 hours after discharge, please contact your surgeon on call. * Please give a list of your current medications to your Primary Care Provider. * Please update this list whenever your medications are discontinued, doses are     changed, or new medications (including over-the-counter products) are added. * Please carry medication information at all times in case of emergency situations. These are general instructions for a healthy lifestyle:    No smoking/ No tobacco products/ Avoid exposure to second hand smoke  Surgeon General's Warning:  Quitting smoking now greatly reduces serious risk to your health. Obesity, smoking, and sedentary lifestyle greatly increases your risk for illness  A healthy diet, regular physical exercise & weight monitoring are important for maintaining a healthy lifestyle    You may be retaining fluid if you have a history of heart failure or if you experience any of the following symptoms:  Weight gain of 3 pounds or more overnight or 5 pounds in a week, increased swelling in our hands or feet or shortness of breath while lying flat in bed. Please call your doctor as soon as you notice any of these symptoms; do not wait until your next office visit. Recognize signs and symptoms of STROKE:  F-face looks uneven    A-arms unable to move or move unevenly    S-speech slurred or non-existent    T-time-call 911 as soon as signs and symptoms begin-DO NOT go       Back to bed or wait to see if you get better-TIME IS BRAIN.         Patient Signature:  Date: 9/20/2018  Discharging Nurse: Jessica Keita RN

## 2018-09-20 NOTE — PROGRESS NOTES
IR Nurse Pre-Procedure Checklist Part 2          Consent signed: Yes    H&P complete:  Not applicable    Antibiotics: Not applicable    Airway/Mallampati Done: Not applicable    Shaved: Not applicable    Pregnancy Form:Yes    Patient Position: Yes    MD Side: Yes     Biopsy Worksheet: Yes    Specimen Medium: Not applicable

## 2018-09-24 LAB
Lab: NORMAL
Lab: NORMAL
REFERENCE LAB,REFLB: NORMAL
REFERENCE LAB,REFLB: NORMAL
TEST DESCRIPTION:,ATST: NORMAL
TEST DESCRIPTION:,ATST: NORMAL

## 2018-12-04 PROBLEM — E05.00 GRAVES' DISEASE: Status: RESOLVED | Noted: 2018-08-16 | Resolved: 2018-12-04

## 2018-12-04 PROBLEM — E04.2 MULTINODULAR GOITER: Status: RESOLVED | Noted: 2018-08-16 | Resolved: 2018-12-04

## 2018-12-04 PROBLEM — E89.0 POSTSURGICAL HYPOTHYROIDISM: Status: ACTIVE | Noted: 2018-12-04

## 2019-02-11 ENCOUNTER — HOSPITAL ENCOUNTER (OUTPATIENT)
Dept: INFUSION THERAPY | Age: 54
Discharge: HOME | End: 2019-02-11
Payer: COMMERCIAL

## 2019-02-11 VITALS
RESPIRATION RATE: 16 BRPM | HEART RATE: 76 BPM | DIASTOLIC BLOOD PRESSURE: 74 MMHG | TEMPERATURE: 97.8 F | SYSTOLIC BLOOD PRESSURE: 110 MMHG | OXYGEN SATURATION: 99 % | WEIGHT: 189.6 LBS | BODY MASS INDEX: 27.2 KG/M2

## 2019-02-11 PROCEDURE — 74011250636 HC RX REV CODE- 250/636: Performed by: INTERNAL MEDICINE

## 2019-02-11 PROCEDURE — 74011000250 HC RX REV CODE- 250: Performed by: INTERNAL MEDICINE

## 2019-02-11 PROCEDURE — 96372 THER/PROPH/DIAG INJ SC/IM: CPT

## 2019-02-11 RX ADMIN — WATER 0.9 MG: 1 INJECTION INTRAMUSCULAR; INTRAVENOUS; SUBCUTANEOUS at 08:35

## 2019-02-11 NOTE — PROGRESS NOTES
Problem: Knowledge Deficit Goal: *Verbalizes understanding of procedures and medications Outcome: Progressing Towards Goal 
Patient here for Thyrogen injection today. The process was reviewed with her and an educational sheet on the thyrogen was given to her. Encouraged her to ask questions as they arise.

## 2019-02-11 NOTE — PROGRESS NOTES
Arrived to the [facility]. Assessment completed. Patient tolerated thyrogen injection well today. She was observed for 30 minutes after the injection today. Any issues or concerns during appointment: none. Patient aware of next infusion appointment on 2/12/19 (date) at 0800 (time) with IV infusion center. Discharged ambulatory, per self. Patient instructed to call her doctor's office immediately for any problems or concerns. She verbalizes understanding.

## 2019-02-12 ENCOUNTER — HOSPITAL ENCOUNTER (OUTPATIENT)
Dept: INFUSION THERAPY | Age: 54
Discharge: HOME | End: 2019-02-12
Payer: COMMERCIAL

## 2019-02-12 VITALS
DIASTOLIC BLOOD PRESSURE: 78 MMHG | SYSTOLIC BLOOD PRESSURE: 126 MMHG | RESPIRATION RATE: 18 BRPM | TEMPERATURE: 98 F | OXYGEN SATURATION: 99 % | HEART RATE: 77 BPM

## 2019-02-12 PROCEDURE — 96372 THER/PROPH/DIAG INJ SC/IM: CPT

## 2019-02-12 PROCEDURE — 74011250636 HC RX REV CODE- 250/636: Performed by: INTERNAL MEDICINE

## 2019-02-12 RX ADMIN — THYROTROPIN ALFA 0.9 MG: 0.9 INJECTION, POWDER, FOR SOLUTION INTRAMUSCULAR at 08:09

## 2019-02-12 NOTE — PROGRESS NOTES
Arrived to the [facility]. Thyrogen injection  completed. Patient tolerated without problems Any issues or concerns during appointment: no 
No further appointments required Discharged ambulatory

## 2019-02-13 ENCOUNTER — HOSPITAL ENCOUNTER (OUTPATIENT)
Dept: NUCLEAR MEDICINE | Age: 54
Discharge: HOME | End: 2019-02-13
Attending: INTERNAL MEDICINE
Payer: COMMERCIAL

## 2019-02-13 ENCOUNTER — HOSPITAL ENCOUNTER (OUTPATIENT)
Dept: LAB | Age: 54
Discharge: HOME | End: 2019-02-13
Attending: INTERNAL MEDICINE
Payer: COMMERCIAL

## 2019-02-13 DIAGNOSIS — C73 PAPILLARY THYROID CARCINOMA (HCC): ICD-10-CM

## 2019-02-13 PROCEDURE — 86800 THYROGLOBULIN ANTIBODY: CPT

## 2019-02-13 PROCEDURE — 36415 COLL VENOUS BLD VENIPUNCTURE: CPT

## 2019-02-13 PROCEDURE — 79005 NUCLEAR RX ORAL ADMIN: CPT

## 2019-02-13 PROCEDURE — 84432 ASSAY OF THYROGLOBULIN: CPT

## 2019-02-15 LAB
THYROGLOB AB SERPL-ACNC: <1 IU/ML (ref 0–0.9)
THYROGLOBULIN, SERUM, 006694: <0.1 NG/ML (ref 1.5–38.5)

## 2019-02-20 ENCOUNTER — HOSPITAL ENCOUNTER (OUTPATIENT)
Dept: NUCLEAR MEDICINE | Age: 54
Discharge: HOME | End: 2019-02-20
Attending: INTERNAL MEDICINE

## 2019-02-20 NOTE — PROGRESS NOTES
Her whole body scan showed uptake only in the neck. This is expected after thyroid surgery. There was no upside outside the neck to suggest spread of her thyroid cancer, which is great news.

## 2019-07-02 ENCOUNTER — HOSPITAL ENCOUNTER (OUTPATIENT)
Dept: GENERAL RADIOLOGY | Age: 54
Discharge: HOME | End: 2019-07-02

## 2019-10-08 ENCOUNTER — HOSPITAL ENCOUNTER (OUTPATIENT)
Dept: CT IMAGING | Age: 54
Discharge: HOME | End: 2019-10-08
Payer: COMMERCIAL

## 2019-10-08 DIAGNOSIS — R22.0 RIGHT FACIAL SWELLING: ICD-10-CM

## 2019-10-08 DIAGNOSIS — C73 PAPILLARY THYROID CARCINOMA (HCC): ICD-10-CM

## 2019-10-08 DIAGNOSIS — H92.01 RIGHT EAR PAIN: ICD-10-CM

## 2019-10-08 DIAGNOSIS — R51.9 RIGHT SIDED FACIAL PAIN: ICD-10-CM

## 2019-10-08 PROCEDURE — 74011000258 HC RX REV CODE- 258

## 2019-10-08 PROCEDURE — 74011636320 HC RX REV CODE- 636/320

## 2019-10-08 PROCEDURE — 70491 CT SOFT TISSUE NECK W/DYE: CPT

## 2019-10-08 RX ORDER — SODIUM CHLORIDE 0.9 % (FLUSH) 0.9 %
10 SYRINGE (ML) INJECTION
Status: COMPLETED | OUTPATIENT
Start: 2019-10-08 | End: 2019-10-08

## 2019-10-08 RX ADMIN — Medication 10 ML: at 13:39

## 2019-10-08 RX ADMIN — SODIUM CHLORIDE 100 ML: 900 INJECTION, SOLUTION INTRAVENOUS at 13:39

## 2019-10-08 RX ADMIN — IOPAMIDOL 100 ML: 755 INJECTION, SOLUTION INTRAVENOUS at 13:39

## 2019-11-05 ENCOUNTER — HOSPITAL ENCOUNTER (OUTPATIENT)
Dept: MAMMOGRAPHY | Age: 54
Discharge: HOME | End: 2019-11-05

## 2019-11-05 DIAGNOSIS — Z12.39 SCREENING FOR MALIGNANT NEOPLASM OF BREAST: ICD-10-CM

## 2020-05-04 ENCOUNTER — HOSPITAL ENCOUNTER (OUTPATIENT)
Dept: INFUSION THERAPY | Age: 55
End: 2020-05-04

## 2020-05-05 ENCOUNTER — APPOINTMENT (OUTPATIENT)
Dept: INFUSION THERAPY | Age: 55
End: 2020-05-05

## 2020-07-16 PROBLEM — J45.20 INTRINSIC ASTHMA WITHOUT STATUS ASTHMATICUS: Status: ACTIVE | Noted: 2020-07-16

## 2020-07-27 ENCOUNTER — HOSPITAL ENCOUNTER (OUTPATIENT)
Dept: INFUSION THERAPY | Age: 55
Discharge: HOME | End: 2020-07-27
Payer: COMMERCIAL

## 2020-07-27 ENCOUNTER — HOSPITAL ENCOUNTER (OUTPATIENT)
Dept: LAB | Age: 55
Discharge: HOME | End: 2020-07-27
Payer: COMMERCIAL

## 2020-07-27 VITALS
SYSTOLIC BLOOD PRESSURE: 130 MMHG | RESPIRATION RATE: 16 BRPM | OXYGEN SATURATION: 100 % | DIASTOLIC BLOOD PRESSURE: 76 MMHG | HEART RATE: 62 BPM | TEMPERATURE: 98.5 F

## 2020-07-27 DIAGNOSIS — C73 PAPILLARY THYROID CARCINOMA (HCC): ICD-10-CM

## 2020-07-27 DIAGNOSIS — E89.0 POSTSURGICAL HYPOTHYROIDISM: ICD-10-CM

## 2020-07-27 LAB — TSH W FREE THYROID I,TSHELE: 2.99 UIU/ML (ref 0.36–3)

## 2020-07-27 PROCEDURE — 84443 ASSAY THYROID STIM HORMONE: CPT

## 2020-07-27 PROCEDURE — 36415 COLL VENOUS BLD VENIPUNCTURE: CPT

## 2020-07-27 PROCEDURE — 96372 THER/PROPH/DIAG INJ SC/IM: CPT

## 2020-07-27 PROCEDURE — 86800 THYROGLOBULIN ANTIBODY: CPT

## 2020-07-27 PROCEDURE — 74011000250 HC RX REV CODE- 250: Performed by: INTERNAL MEDICINE

## 2020-07-27 PROCEDURE — 84432 ASSAY OF THYROGLOBULIN: CPT

## 2020-07-27 PROCEDURE — 74011250636 HC RX REV CODE- 250/636: Performed by: INTERNAL MEDICINE

## 2020-07-27 RX ADMIN — THYROTROPIN ALFA 0.9 MG: 0.9 INJECTION, POWDER, FOR SOLUTION INTRAMUSCULAR at 08:38

## 2020-07-27 NOTE — PROGRESS NOTES
Arrived to the [facility]. Thyrogen completed. Patient tolerated without adverse reaction. Any issues or concerns during appointment: none. Patient aware of next infusion appointment on 7/28 (date) at 0800 (time). Discharged to home.

## 2020-07-28 ENCOUNTER — HOSPITAL ENCOUNTER (OUTPATIENT)
Dept: INFUSION THERAPY | Age: 55
Discharge: HOME | End: 2020-07-28
Payer: COMMERCIAL

## 2020-07-28 VITALS
TEMPERATURE: 97.7 F | SYSTOLIC BLOOD PRESSURE: 135 MMHG | RESPIRATION RATE: 16 BRPM | OXYGEN SATURATION: 100 % | DIASTOLIC BLOOD PRESSURE: 69 MMHG | HEART RATE: 65 BPM

## 2020-07-28 LAB
THYROGLOB AB SERPL-ACNC: <1 IU/ML (ref 0–0.9)
THYROGLOB AB SERPL-ACNC: <1 IU/ML (ref 0–0.9)
THYROGLOBULIN, SERUM, 006694: <0.1 NG/ML (ref 1.5–38.5)
THYROGLOBULIN, SERUM, 006694: <0.1 NG/ML (ref 1.5–38.5)

## 2020-07-28 PROCEDURE — 74011000250 HC RX REV CODE- 250: Performed by: INTERNAL MEDICINE

## 2020-07-28 PROCEDURE — 96372 THER/PROPH/DIAG INJ SC/IM: CPT

## 2020-07-28 PROCEDURE — 74011250636 HC RX REV CODE- 250/636: Performed by: INTERNAL MEDICINE

## 2020-07-28 RX ADMIN — THYROTROPIN ALFA 0.9 MG: 0.9 INJECTION, POWDER, FOR SOLUTION INTRAMUSCULAR at 10:41

## 2020-07-29 ENCOUNTER — HOSPITAL ENCOUNTER (OUTPATIENT)
Dept: NUCLEAR MEDICINE | Age: 55
Discharge: HOME | End: 2020-07-29
Attending: INTERNAL MEDICINE
Payer: COMMERCIAL

## 2020-07-29 DIAGNOSIS — C73 PAPILLARY THYROID CARCINOMA (HCC): ICD-10-CM

## 2020-07-29 PROCEDURE — 78018 THYROID MET IMAGING BODY: CPT

## 2020-07-29 NOTE — PROGRESS NOTES
Arrived to the [facility]. Thyrogen injection completed. Provided education on thyrogen/IM injections. Patient instructed to report any side affects to ordering provider. Patient tolerated well. Any issues or concerns during appointment: none. Patient to follow up with MD regarding next infusion appointment. Discharged ambulatory to home.

## 2020-07-30 ENCOUNTER — HOSPITAL ENCOUNTER (OUTPATIENT)
Dept: NUCLEAR MEDICINE | Age: 55
Discharge: HOME | End: 2020-07-30
Attending: INTERNAL MEDICINE
Payer: COMMERCIAL

## 2020-07-31 ENCOUNTER — HOSPITAL ENCOUNTER (OUTPATIENT)
Dept: NUCLEAR MEDICINE | Age: 55
Discharge: HOME | End: 2020-07-31
Attending: INTERNAL MEDICINE
Payer: COMMERCIAL

## 2020-07-31 ENCOUNTER — HOSPITAL ENCOUNTER (OUTPATIENT)
Dept: LAB | Age: 55
Discharge: HOME | End: 2020-07-31
Payer: COMMERCIAL

## 2020-07-31 DIAGNOSIS — C73 PAPILLARY THYROID CARCINOMA (HCC): ICD-10-CM

## 2020-07-31 PROCEDURE — 36415 COLL VENOUS BLD VENIPUNCTURE: CPT

## 2020-07-31 PROCEDURE — 84432 ASSAY OF THYROGLOBULIN: CPT

## 2020-07-31 PROCEDURE — 86800 THYROGLOBULIN ANTIBODY: CPT

## 2020-08-03 LAB
THYROGLOB AB SERPL-ACNC: <1 IU/ML (ref 0–0.9)
THYROGLOBULIN, SERUM, 006694: 0.1 NG/ML (ref 1.5–38.5)

## 2021-01-18 ENCOUNTER — HOSPITAL ENCOUNTER (OUTPATIENT)
Dept: LAB | Age: 56
Discharge: HOME | End: 2021-01-18
Payer: COMMERCIAL

## 2021-01-18 DIAGNOSIS — R00.2 PALPITATIONS: ICD-10-CM

## 2021-01-18 DIAGNOSIS — I10 ESSENTIAL HYPERTENSION: ICD-10-CM

## 2021-01-18 LAB
ALBUMIN SERPL-MCNC: 4 G/DL (ref 3.5–5)
ALBUMIN/GLOB SERPL: 1.1 {RATIO} (ref 1.2–3.5)
ALP SERPL-CCNC: 99 U/L (ref 50–136)
ALT SERPL-CCNC: 35 U/L (ref 12–65)
ANION GAP SERPL CALC-SCNC: 4 MMOL/L (ref 7–16)
AST SERPL-CCNC: 16 U/L (ref 15–37)
BASOPHILS # BLD: 0 K/UL (ref 0–0.2)
BASOPHILS NFR BLD: 0 % (ref 0–2)
BILIRUB SERPL-MCNC: 0.4 MG/DL (ref 0.2–1.1)
BUN SERPL-MCNC: 13 MG/DL (ref 6–23)
CALCIUM SERPL-MCNC: 8.8 MG/DL (ref 8.3–10.4)
CHLORIDE SERPL-SCNC: 107 MMOL/L (ref 98–107)
CHOLEST SERPL-MCNC: 191 MG/DL
CO2 SERPL-SCNC: 32 MMOL/L (ref 21–32)
CREAT SERPL-MCNC: 0.91 MG/DL (ref 0.6–1)
DIFFERENTIAL METHOD BLD: NORMAL
EOSINOPHIL # BLD: 0.1 K/UL (ref 0–0.8)
EOSINOPHIL NFR BLD: 2 % (ref 0.5–7.8)
ERYTHROCYTE [DISTWIDTH] IN BLOOD BY AUTOMATED COUNT: 13 % (ref 11.9–14.6)
GLOBULIN SER CALC-MCNC: 3.6 G/DL (ref 2.3–3.5)
GLUCOSE SERPL-MCNC: 99 MG/DL (ref 65–100)
HCT VFR BLD AUTO: 40.8 % (ref 35.8–46.3)
HDLC SERPL-MCNC: 47 MG/DL (ref 40–60)
HDLC SERPL: 4.1 {RATIO}
HGB BLD-MCNC: 13.2 G/DL (ref 11.7–15.4)
IMM GRANULOCYTES # BLD AUTO: 0 K/UL (ref 0–0.5)
IMM GRANULOCYTES NFR BLD AUTO: 0 % (ref 0–5)
LDLC SERPL CALC-MCNC: 108.4 MG/DL
LIPID PROFILE,FLP: ABNORMAL
LYMPHOCYTES # BLD: 2.8 K/UL (ref 0.5–4.6)
LYMPHOCYTES NFR BLD: 40 % (ref 13–44)
MCH RBC QN AUTO: 28.1 PG (ref 26.1–32.9)
MCHC RBC AUTO-ENTMCNC: 32.4 G/DL (ref 31.4–35)
MCV RBC AUTO: 86.8 FL (ref 79.6–97.8)
MONOCYTES # BLD: 0.4 K/UL (ref 0.1–1.3)
MONOCYTES NFR BLD: 6 % (ref 4–12)
NEUTS SEG # BLD: 3.6 K/UL (ref 1.7–8.2)
NEUTS SEG NFR BLD: 52 % (ref 43–78)
NRBC # BLD: 0 K/UL (ref 0–0.2)
PLATELET # BLD AUTO: 215 K/UL (ref 150–450)
PMV BLD AUTO: 10 FL (ref 9.4–12.3)
POTASSIUM SERPL-SCNC: 3.5 MMOL/L (ref 3.5–5.1)
PROT SERPL-MCNC: 7.6 G/DL (ref 6.3–8.2)
RBC # BLD AUTO: 4.7 M/UL (ref 4.05–5.2)
SODIUM SERPL-SCNC: 143 MMOL/L (ref 136–145)
TRIGL SERPL-MCNC: 178 MG/DL (ref 35–150)
VLDLC SERPL CALC-MCNC: 35.6 MG/DL (ref 6–23)
WBC # BLD AUTO: 7 K/UL (ref 4.3–11.1)

## 2021-01-18 PROCEDURE — 80061 LIPID PANEL: CPT

## 2021-01-18 PROCEDURE — 36415 COLL VENOUS BLD VENIPUNCTURE: CPT

## 2021-01-18 PROCEDURE — 80053 COMPREHEN METABOLIC PANEL: CPT

## 2021-01-18 PROCEDURE — 85025 COMPLETE CBC W/AUTO DIFF WBC: CPT

## 2021-01-18 NOTE — PROGRESS NOTES
Please call the patient regarding their normal result. Cholesterol numbers look good, better than 1 year ago.      Blood counts, kidney, liver function normal.

## 2021-05-21 ENCOUNTER — HOSPITAL ENCOUNTER (OUTPATIENT)
Dept: ULTRASOUND IMAGING | Age: 56
Discharge: HOME | End: 2021-05-21
Attending: INTERNAL MEDICINE
Payer: COMMERCIAL

## 2021-05-21 DIAGNOSIS — C73 PAPILLARY THYROID CARCINOMA (HCC): ICD-10-CM

## 2021-05-21 PROCEDURE — 76536 US EXAM OF HEAD AND NECK: CPT

## 2022-03-18 PROBLEM — E89.0 POSTSURGICAL HYPOTHYROIDISM: Status: ACTIVE | Noted: 2018-12-04

## 2022-03-20 PROBLEM — C73 PAPILLARY THYROID CARCINOMA (HCC): Status: ACTIVE | Noted: 2018-08-20

## 2022-03-20 PROBLEM — E05.00 GRAVES' OPHTHALMOPATHY: Status: ACTIVE | Noted: 2018-08-16

## 2022-03-20 PROBLEM — R00.2 PALPITATIONS: Status: ACTIVE | Noted: 2018-08-16

## 2022-05-28 DIAGNOSIS — E89.0 POSTSURGICAL HYPOTHYROIDISM: Primary | ICD-10-CM

## 2022-05-28 DIAGNOSIS — C73 PAPILLARY THYROID CARCINOMA (HCC): ICD-10-CM

## 2022-07-07 NOTE — PROGRESS NOTES
She is a 78-year-old female seen for follow-up of asthma, allergic rhinitis. Amrita Villarreal  DIAGNOSTICS:       PFT's 7/08 per primary MD - restrictive defect. Spirometry 8/08 - normal.  CXR 8/2008 - no acute cardiopulmonary process. Labs 8/08 IgE level - 82. Rast level and eosinophil were unremarkable.    Methacholine challenge 9/08 - although not typical for positive criteria, there was a trend toward worsening and reversibility following bronchodilator.    Spirometry 1/09 -- normal, significant improvement with initiation of Advair and Singulair. PFT's 11/09 - normal, improved.    Spirometry 12/20/2011 - normal, interval improvement. PA and lateral CXR 12/20/11 - clear lung fields, no acute cardiopulmonary abnormality. Spirometry 8/12 - normal.  Spirometry 7/26/13 - FVC is 2.87 L, FEV1 2.5 L, FEV1/FVC ratio 87%, interval decline in FVC and FEV1. PA and lateral CXR 7/22/13no acute abnormality. Spirometry 7/10/2015  normal, slight but statistically insignificant decline in FEV1. Spirometry 7/5/2016 - normal, no significant change. Spirometry 1/3/2018normal, no significant interval change. Spirometry 11/29/2018mild restrictive defect, interval decline in FVC and FEV1.   Spirometry 6/7/2019 normal, interval improvement in FVC and FEV1.   Spirometry 7/19/2021mild restrictive defect/borderline normal, interval decline in FVC and FEV1

## 2022-07-08 ENCOUNTER — OFFICE VISIT (OUTPATIENT)
Dept: PULMONOLOGY | Age: 57
End: 2022-07-08
Payer: COMMERCIAL

## 2022-07-08 VITALS
HEIGHT: 70 IN | WEIGHT: 189 LBS | HEART RATE: 79 BPM | OXYGEN SATURATION: 99 % | TEMPERATURE: 97.2 F | BODY MASS INDEX: 27.06 KG/M2 | SYSTOLIC BLOOD PRESSURE: 114 MMHG | DIASTOLIC BLOOD PRESSURE: 86 MMHG

## 2022-07-08 DIAGNOSIS — Z85.850 HX OF PAPILLARY THYROID CARCINOMA: ICD-10-CM

## 2022-07-08 DIAGNOSIS — J30.89 CHRONIC NON-SEASONAL ALLERGIC RHINITIS: ICD-10-CM

## 2022-07-08 DIAGNOSIS — E89.0 POSTOPERATIVE HYPOTHYROIDISM: ICD-10-CM

## 2022-07-08 DIAGNOSIS — J45.30 MILD PERSISTENT ASTHMA WITHOUT COMPLICATION: Primary | ICD-10-CM

## 2022-07-08 LAB
EXPIRATORY TIME: NORMAL
FEF 25-75% %PRED-PRE: NORMAL
FEF 25-75% PRED: NORMAL
FEF 25-75%-PRE: NORMAL
FEV1 %PRED-PRE: 86 %
FEV1 PRED: NORMAL
FEV1/FVC %PRED-PRE: NORMAL
FEV1/FVC PRED: NORMAL
FEV1/FVC: 84 %
FEV1: 2.37 L
FVC %PRED-PRE: 81 %
FVC PRED: NORMAL
FVC: 2.83 L
PEF %PRED-PRE: NORMAL
PEF PRED: NORMAL
PEF-PRE: NORMAL

## 2022-07-08 PROCEDURE — 94010 BREATHING CAPACITY TEST: CPT | Performed by: NURSE PRACTITIONER

## 2022-07-08 PROCEDURE — 99213 OFFICE O/P EST LOW 20 MIN: CPT | Performed by: NURSE PRACTITIONER

## 2022-07-08 RX ORDER — ALBUTEROL SULFATE 90 UG/1
AEROSOL, METERED RESPIRATORY (INHALATION)
Qty: 18 G | Refills: 11 | Status: SHIPPED | OUTPATIENT
Start: 2022-07-08

## 2022-07-08 RX ORDER — FLUTICASONE PROPIONATE 50 MCG
2 SPRAY, SUSPENSION (ML) NASAL DAILY
Qty: 3 EACH | Refills: 3 | Status: SHIPPED | OUTPATIENT
Start: 2022-07-08

## 2022-07-08 RX ORDER — MONTELUKAST SODIUM 10 MG/1
10 TABLET ORAL NIGHTLY
Qty: 90 TABLET | Refills: 3 | Status: SHIPPED | OUTPATIENT
Start: 2022-07-08 | End: 2023-07-03

## 2022-07-08 RX ORDER — FLUTICASONE FUROATE AND VILANTEROL 100; 25 UG/1; UG/1
POWDER RESPIRATORY (INHALATION)
Qty: 1 EACH | Refills: 11 | Status: SHIPPED | OUTPATIENT
Start: 2022-07-08

## 2022-07-08 ASSESSMENT — ENCOUNTER SYMPTOMS
CHEST TIGHTNESS: 0
COUGH: 0
DIARRHEA: 0
WHEEZING: 0
ABDOMINAL PAIN: 0
CONSTIPATION: 0
SHORTNESS OF BREATH: 0
VOMITING: 0
NAUSEA: 0

## 2022-07-08 ASSESSMENT — PULMONARY FUNCTION TESTS
FEV1/FVC: 84
FEV1: 2.37
FVC: 2.83
FEV1_PERCENT_PREDICTED_PRE: 86
FVC_PERCENT_PREDICTED_PRE: 81

## 2022-07-08 NOTE — PATIENT INSTRUCTIONS
Albuterol 2 puffs four times daily if needed for shortness of breath or wheezing. Continue Singulair 10 mg at bedtime. Flonase 1 to 2 sprays each nostril daily as needed for nasal congestion/drainage. If she has increased symptoms or need for albuterol, advised to resume Breo but I believe that she will be able to tolerate lower dose of 100/25. She has had Prevnar 13 at her local pharmacy. Will need booster of PPSV 23 at age 73. Flu vaccine in fall and COVID vaccine prior to international travel in November, or for increased COVID cases noted in community. Follow-up in 6 months with spirometry. If she is stable at that time, will see on annual and as needed basis.

## 2022-07-08 NOTE — PROGRESS NOTES
[address]. [address], [address] [facility]  [phone]    Patient Name:  Marilia Ricks    YOB: 1965    Office Visit 7/8/2022      CHIEF COMPLAINT:      Chief Complaint   Patient presents with    Asthma    Follow-up    Other     allergic rhinitis         HISTORY OF PRESENT ILLNESS:     She is a 41-year-old female seen for follow-up of asthma, allergic rhinitis.      Today, she reports that she has done very well since her last visit from a respiratory standpoint. She had 1 episode in the spring where she had coughing and required albuterol on 2 occasions that day. No recurrence. She has remained off with breo with out any worsening in symptoms. Allergy symptoms are currently controlled with Singulair, Flonase. She will be traveling internationally with her son in the fall to rugby event. He is very active in special needs events.      DIAGNOSTICS:       PFT's 7/08 per primary MD - restrictive defect. Spirometry 8/08 - normal.  CXR 8/2008 - no acute cardiopulmonary process. Labs 8/08 IgE level - 82. Rast level and eosinophil were unremarkable.    Methacholine challenge 9/08 - although not typical for positive criteria, there was a trend toward worsening and reversibility following bronchodilator.    Spirometry 1/09 -- normal, significant improvement with initiation of Advair and Singulair. PFT's 11/09 - normal, improved.    Spirometry 12/20/2011 - normal, interval improvement. PA and lateral CXR 12/20/11 - clear lung fields, no acute cardiopulmonary abnormality. Spirometry 8/12 - normal.  Spirometry 7/26/13 - FVC is 2.87 L, FEV1 2.5 L, FEV1/FVC ratio 87%, interval decline in FVC and FEV1. PA and lateral CXR 7/22/13-no acute abnormality. Spirometry 7/10/2015 - normal, slight but statistically insignificant decline in FEV1. Spirometry 7/5/2016 - normal, no significant change.   Spirometry 1/3/2018-normal, no significant interval change. Spirometry 11/29/2018-mild restrictive defect, interval decline in FVC and FEV1. Spirometry 6/7/2019- normal, interval improvement in FVC and FEV1.   Spirometry 7/19/2021-mild restrictive defect/borderline normal, interval decline in FVC and FEV1. Spirometry 7/8/2022-normal, interval improvement in FVC and FEV1    Past Medical History:   Diagnosis Date    Asthma     Depression 02/2017    Hives     Hx of esophagogastroduodenoscopy 2007    secondary to dysphagia    Hypertension     no medication at this time    Reactive airway disease     Urticaria        Patient Active Problem List   Diagnosis    Postsurgical hypothyroidism    HTN (hypertension)    Hives    Chronic non-seasonal allergic rhinitis    Graves' ophthalmopathy    Papillary thyroid carcinoma ([address]    Mild persistent asthma without complication    Palpitations         Past Surgical History:   Procedure Laterality Date    COLONOSCOPY N/A 7/6/2018    COLONOSCOPY  BMI 27 performed by Jonathan Padgett MD at [facility] 162 FLX W/RMVL OF TUMOR POLYP LESION [address] [place]  7/6/2018         HYSTERECTOMY (CERVIX STATUS UNKNOWN)  2012    TONSILLECTOMY  1973    US LYMPH NODE BIOPSY  9/20/2018    US LYMPH NODE BIOPSY 9/20/2018 [facility] [address] LYMPH NODE BIOPSY  9/20/2018    US LYMPH NODE BIOPSY 9/20/2018 [facility]         Social History     Socioeconomic History    Marital status:      Spouse name: None    Number of children: None    Years of education: None    Highest education level: None   Occupational History    None   Tobacco Use    Smoking status: Never Smoker    Smokeless tobacco: Never Used   Substance and Sexual Activity    Alcohol use: Yes     Alcohol/week: 0.0 standard drinks    Drug use: Never    Sexual activity: None   Other Topics Concern    None   Social History Narrative    No known toxic industrial or environmental exposure. She has lived in [facility] and [place]. She is employed as a teacher in [place]. She is  and resides in [place] Social Determinants of Health     Financial Resource Strain:     Difficulty of Paying Living Expenses: Not on file   Food Insecurity:     Worried About Running Out of Food in the Last Year: Not on file    Shay of Food in the Last Year: Not on file   Transportation Needs:     Lack of Transportation (Medical): Not on file    Lack of Transportation (Non-Medical):  Not on file   Physical Activity:     Days of Exercise per Week: Not on file    Minutes of Exercise per Session: Not on file   Stress:     Feeling of Stress : Not on file   Social Connections:     Frequency of Communication with Friends and Family: Not on file    Frequency of Social Gatherings with Friends and Family: Not on file    Attends Yazidi Services: Not on file    Active Member of [address] [place] [address] [employer] or Organizations: Not on file    Attends Club or Organization Meetings: Not on file    Marital Status: Not on file   Intimate Partner Violence:     Fear of Current or Ex-Partner: Not on file    Emotionally Abused: Not on file    Physically Abused: Not on file    Sexually Abused: Not on file   Housing Stability:     Unable to Pay for Housing in the Last Year: Not on file    Number of [place] in the Last Year: Not on file    Unstable Housing in the Last Year: Not on file       Family History   Problem Relation Age of Onset    Thyroid Cancer Neg Hx     Lung Disease Neg Hx     Thyroid Disease Neg Hx        No Known Allergies    Current Outpatient Medications   Medication Sig    albuterol sulfate  (90 Base) MCG/ACT inhaler 2 puffs qid prn    clindamycin (CLEOCIN T) 1 % external solution Use thin film on affected area    Eflornithine HCl 13.9 % CREA Apply 1 each topically 2 times daily    fluticasone-vilanterol (BREO ELLIPTA) 100-25 MCG/INH AEPB inhaler Inhale 1 puff into the lungs daily    fluticasone (FLONASE) 50 MCG/ACT nasal spray 2 sprays by Nasal route daily    hydroCHLOROthiazide (HYDRODIURIL) 25 MG tablet Take 25 mg by mouth daily    hydrocortisone (HYTONE) 2.5 % lotion APPLY TO AFFECTED AREA TWICE A DAY FOR 7 DAYS    levothyroxine (SYNTHROID) 150 MCG tablet 1 tablet once a day with an extra 1/2 tablet on Sundays    LORazepam (ATIVAN) 0.5 MG tablet 1 tablet at bedtime as needed    metoprolol succinate (TOPROL XL) 50 MG extended release tablet Take 50 mg by mouth daily    montelukast (SINGULAIR) 10 MG tablet 1 po q hs     No current facility-administered medications for this visit. REVIEW OF SYSTEMS:    Review of Systems   Constitutional: Negative for chills, fatigue, fever and unexpected weight change. Respiratory: Negative for cough, chest tightness, shortness of breath and wheezing. Cardiovascular: Negative for chest pain, palpitations and leg swelling. Gastrointestinal: Negative for abdominal pain, constipation, diarrhea, nausea and vomiting. Neurological: Negative for dizziness, tremors, seizures, weakness and headaches. PHYSICAL EXAM:    Vitals:    07/08/22 1046   BP: 114/86   Pulse: 79   Temp: 97.2 °F (36.2 °C)   TempSrc: Skin   SpO2: 99%   Weight: 189 lb (85.7 kg)   Height: 5' 10\" (1.778 m)        Body mass index is 27.12 kg/m². GENERAL APPEARANCE:  The patient is normal weight and in no respiratory distress. HEENT:  PERRL. Conjunctivae unremarkable. NECK/LYMPHATIC:   Symmetrical with no elevation of jugular venous pulsation. Trachea midline. No thyroid enlargement. No cervical adenopathy. LUNGS:   Normal respiratory effort with symmetrical lung expansion. Breath sounds- slightly decreased but completely clear. Vearl Pippins HEART:   There is a normal rate and regular rhythm. No murmur, rub, or gallop. There is no edema in the lower extremities. NEURO:  The patient is alert and oriented to person, place, and time.   Memory appears intact and mood is normal.  No gross sensorimotor deficits are each     Refill:  11    fluticasone (FLONASE) 50 MCG/ACT nasal spray     Si sprays by Nasal route daily     Dispense:  3 each     Refill:  3    montelukast (SINGULAIR) 10 MG tablet     Sig: Take 1 tablet by mouth nightly 1 po q hs     Dispense:  90 tablet     Refill:  3           RUBINA MURGUIA, APRN - CNP    Total  time spent with patient -  min. Over 50% of today's office visit was spent in face to face time reviewing test results, prognosis, importance of compliance, education about disease process, benefits of medications, instructions for management of acute symptoms, and follow up plans. Collaborating MD:      Electronically signed. Dictated using voice recognition software.   Proof read but unrecognized errors may exist.

## 2022-08-29 ENCOUNTER — OFFICE VISIT (OUTPATIENT)
Dept: ENDOCRINOLOGY | Age: 57
End: 2022-08-29
Payer: COMMERCIAL

## 2022-08-29 VITALS
SYSTOLIC BLOOD PRESSURE: 118 MMHG | DIASTOLIC BLOOD PRESSURE: 74 MMHG | HEART RATE: 89 BPM | HEIGHT: 70 IN | OXYGEN SATURATION: 98 % | BODY MASS INDEX: 27.06 KG/M2 | WEIGHT: 189 LBS

## 2022-08-29 DIAGNOSIS — E89.0 POSTSURGICAL HYPOTHYROIDISM: ICD-10-CM

## 2022-08-29 DIAGNOSIS — C73 PAPILLARY THYROID CARCINOMA (HCC): Primary | ICD-10-CM

## 2022-08-29 DIAGNOSIS — E05.00 GRAVES' OPHTHALMOPATHY: ICD-10-CM

## 2022-08-29 PROCEDURE — 99214 OFFICE O/P EST MOD 30 MIN: CPT | Performed by: INTERNAL MEDICINE

## 2022-08-29 RX ORDER — LEVOTHYROXINE SODIUM 150 MCG
150 TABLET ORAL DAILY
COMMUNITY
End: 2022-08-29 | Stop reason: SDUPTHER

## 2022-08-29 RX ORDER — MELOXICAM 15 MG/1
TABLET ORAL
COMMUNITY
Start: 2022-08-16

## 2022-08-29 RX ORDER — LEVOTHYROXINE SODIUM 150 MCG
150 TABLET ORAL DAILY
Qty: 90 TABLET | Refills: 3 | Status: SHIPPED | OUTPATIENT
Start: 2022-08-29

## 2022-08-29 RX ORDER — ATORVASTATIN CALCIUM 10 MG/1
TABLET, FILM COATED ORAL
COMMUNITY
Start: 2022-07-06

## 2022-08-29 ASSESSMENT — ENCOUNTER SYMPTOMS
CONSTIPATION: 0
DIARRHEA: 0

## 2022-08-29 NOTE — PROGRESS NOTES
Jeffery Love MD, [facility]  [address], [address] [address]  Phone [phone]  Facsimile [phone]          Marilia Ricks is a 55 y.o. female seen 8/29/2022 for follow up of thyroid cancer and hypothyroidism        ASSESSMENT AND PLAN:    1. Papillary thyroid carcinoma (HCC)  Multifocal papillary thyroid carcinoma involving the right lobe with the largest tumor measuring 1.2 cm with central lymph node metastases with extranodal extension ([id number]) status post total thyroidectomy and central lymphadenectomy 11/2018, status post WING-131 151 mCi 2/2019. Thyrogen stimulated whole-body scan negative 7/2020. Her stimulated thyroglobulin level was very low as well. Neck ultrasound negative 5/2021. Her thyroglobulin level has been negative, most recently 8/2022. At this point she appears to be disease free. I will repeat a thyroglobulin level and neck ultrasound prior to next visit. 2. Postsurgical hypothyroidism  Goal TSH 0.1-0.5. She is currently over replaced and I will decrease her Synthroid as below. I will repeat her thyroid function tests in 2 months and prior to next visit.    - SYNTHROID 150 MCG tablet; Take 1 tablet by mouth Daily  Dispense: 90 tablet; Refill: 3    3. Graves' ophthalmopathy  We have discussed the data regarding selenium supplementation. Followed by Dr. Chapis Gallagher 200 MCG CAPS; Take 1 capsule by mouth daily  Dispense: 1 capsule; Refill: 0      Follow-up and Dispositions    Return in about 6 months (around 2/28/2023).          HISTORY OF PRESENT ILLNESS:    THYROID CANCER     Presentation: Multiple thyroid nodules in a background of Graves' disease status post FNA biopsy revealing papillary thyroid carcinoma, status post total thyroidectomy and central lymphadenectomy 11/9/2018 (pathology revealed multifocal papillary thyroid carcinoma involving the right lobe with the foci measuring 0.6 cm, 1.1 cm and 1.2 cm; negative margins, no angioinvasion, +lymphatic invasion, no perineural invasion, no extrathyroidal extension; 5 out of 6 level 6 lymph nodes positive for metastatic papillary thyroid carcinoma with the largest deposit measuring 1.3 cm with extranodal extension present; [id number]; +diffuse follicular hyperplasia), status post WING-131 151 mCi 2/13/2019. Surgical complications: None. Current symptoms: Denies neck lumps, lymphadenopathy, dysphagia. Imaging:  Neck ultrasound 8/29/2018: Right level 6 lymph node measuring 0.9 x 0.8 x 0.9 cm without an echogenic hilum. It contains at least 1 punctate echogenic focus and demonstrates peripheral blood flow. Right level 6 lymph node measuring 0.6 x 0.6 x 0.5 cm with a rounded appearance and multiple echogenic foci. Left level 6 lymph node measuring 1 x 1.2 x 1 cm with a partially effaced echogenic hilum. It demonstrates cystic areas and multiple punctate echogenic foci with peripheral vascular flow. Left level 6 lymph node measuring 1.3 x 1 x 0.9 cm without a fatty hilum. FNA biopsy right level 6 lymph node 9/20/2018: Malignant cells consistent with papillary thyroid carcinoma. FNA biopsy left level 6 lymph node 9/20/2018: Malignant cells consistent with papillary thyroid carcinoma. Posttreatment whole body scan 2/20/2019: Expected radioiodine uptake in the thyroid bed bilaterally. No other focus of accumulation is seen to suggest metastatic disease. CT neck with contrast 10/8/2019: No adenopathy. No locally recurrent mass in the thyroid bed. Thyrogen stimulated whole-body scan 7/29/2020: Negative. Neck ultrasound 5/21/2021: No evidence of local disease recurrence within the thyroid fossa. No pathologic adenopathy in the neck. Labs:  8/16/2018:  Thyroid stimulating immunoglobulins 2.81.  12/4/2018: TSH 3.690, free T4 1.16, Tg 0.8, Tg Ab <1.0.  2/13/2019: Thyrogen stimulated Tg <0.1, Tg Ab <1.0.  4/3/2019: TSH 0.351, free T4 1.19, Tg 0.2, Tg Ab <1.0.  8/30/2019: TSH 4.140, Tg <0.1, Tg Ab <1.0.  12/3/2019: TSH 1.820.  6/26/2020: TSH 0.11, free T4 1.24.  7/27/2020: TSH 2.99, Tg <0.1, Tg Ab <1.0.  7/31/2020: Thyrogen stimulated Tg 0.1, Tg Ab <1.0.  11/25/2020: TSH 0.019, free T4 2.07, Tg <0.1, Tg Ab <1.0.  1/11/2021: TSH 0.046, free T4 1.58.  5/14/2021: TSH <0.005, free T4 2.20, Tg <0.1, Tg Ab <1.0.  9/20/2021: TSH 0.011, free T4 1.58, Tg <0.1, Tg Ab <1.0 (on Synthroid 162.5 mcg daily). 3/7/2022: TSH 1.590 (on Synthroid 150 mcg daily). 8/26/2022: TSH 0.052, free T4 2.09, Tg <0.1, Tg Ab <1.0 (on Synthroid 161 mcg daily). THYROID DISEASE     Presentation/Diagnosis: Postsurgical hypothyroidism     Symptoms: See review of systems. Associated Conditions: Denies pain or pressure behind her eyes. She has some irritation and tearing, worse in the left eye. Denies proptosis. She reports periorbital edema. Denies diplopia. She took a prednisone taper following radioactive iodine. She is followed by Dr. Monik Shore. Treatment: Takes name brand in AM with metoprolol. Pregnancy status: Postmenopausal.      Review of Systems   Constitutional:  Positive for diaphoresis. Negative for fatigue. Weight increased 4 pounds since last visit. Cardiovascular:  Negative for palpitations. Gastrointestinal:  Negative for constipation and diarrhea. Endocrine: Positive for heat intolerance. Negative for cold intolerance. Skin:         She has an area of patchy alopecia, which is growing back in. Denies dry skin. Neurological:  Negative for tremors. Vital Signs:  /74   Pulse 89   Ht 5' 10\" (1.778 m)   Wt 189 lb (85.7 kg)   SpO2 98%   BMI 27.12 kg/m²     Wt Readings from Last 3 Encounters:   08/29/22 189 lb (85.7 kg)   07/08/22 189 lb (85.7 kg)   03/08/22 185 lb (83.9 kg)       Physical Exam  Constitutional:       General: She is not in acute distress. Eyes:      Comments:  +Right lower eyelid retraction. No obvious proptosis. +Mild periorbital edema. Neck:      Comments: Thyroidectomy scar. Cardiovascular:      Rate and Rhythm: Normal rate and regular rhythm. Lymphadenopathy:      Cervical: No cervical adenopathy. Neurological:      Motor: No tremor. Orders Placed This Encounter   Procedures    US HEAD NECK SOFT TISSUE THYROID           Standing Status:   Future     Standing Expiration Date:   8/29/2023     Scheduling Instructions:      Schedule in 2/2023 prior to follow up visit with me.      Order Specific Question:   Reason for exam:     Answer:   Please evaluate thyroid bed and cervical lymph nodes    TSH with Reflex     Standing Status:   Future     Standing Expiration Date:   8/29/2023    TSH with Reflex     Standing Status:   Future     Standing Expiration Date:   8/29/2023    Thyroglobulin Panel     Standing Status:   Future     Standing Expiration Date:   8/29/2023         Current Outpatient Medications   Medication Sig Dispense Refill    atorvastatin (LIPITOR) 10 MG tablet TAKE 1 TABLET BY MOUTH EVERY DAY FOR 30 DAYS      meloxicam (MOBIC) 15 MG tablet       BIOTIN PO Take by mouth      Emollient (COLLAGEN EX) Apply topically      SYNTHROID 150 MCG tablet Take 1 tablet by mouth Daily 90 tablet 3    Selenium 200 MCG CAPS Take 1 capsule by mouth daily 1 capsule 0    albuterol sulfate HFA (PROVENTIL;VENTOLIN;PROAIR) 108 (90 Base) MCG/ACT inhaler 2 puffs qid prn wheezing or shortness of breath 18 g 11    fluticasone-vilanterol (BREO ELLIPTA) 100-25 MCG/INH AEPB inhaler 1 inhalation every morning, rinse mouth after use 1 each 11    fluticasone (FLONASE) 50 MCG/ACT nasal spray 2 sprays by Nasal route daily 3 each 3    montelukast (SINGULAIR) 10 MG tablet Take 1 tablet by mouth nightly 1 po q hs 90 tablet 3    clindamycin (CLEOCIN T) 1 % external solution Use thin film on affected area      Eflornithine HCl 13.9 % CREA Apply 1 each topically 2 times daily      hydroCHLOROthiazide (HYDRODIURIL) 25 MG tablet Take 25 mg by mouth daily      hydrocortisone (HYTONE) 2.5 % lotion APPLY TO AFFECTED AREA TWICE A DAY FOR 7 DAYS      LORazepam (ATIVAN) 0.5 MG tablet 1 tablet at bedtime as needed      metoprolol succinate (TOPROL XL) 50 MG extended release tablet Take 50 mg by mouth daily       No current facility-administered medications for this visit.

## 2023-01-05 RX ORDER — MONTELUKAST SODIUM 10 MG/1
TABLET ORAL
Qty: 90 TABLET | Refills: 3 | OUTPATIENT
Start: 2023-01-05

## 2023-01-26 NOTE — PROGRESS NOTES
She is a 81-year-old female seen for follow-up of asthma, allergic rhinitis. DIAGNOSTICS:       PFT's 7/08 per primary MD - restrictive defect. Spirometry 8/08 - normal.  CXR 8/2008 - no acute cardiopulmonary process. Labs 8/08 IgE level - 82. Rast level and eosinophil were unremarkable. Methacholine challenge 9/08 - although not typical for positive criteria, there was a trend toward worsening and reversibility following bronchodilator. Spirometry 1/09 -- normal, significant improvement with initiation of Advair and Singulair. PFT's 11/09 - normal, improved. Spirometry 12/20/2011 - normal, interval improvement. PA and lateral CXR 12/20/11 - clear lung fields, no acute cardiopulmonary abnormality. Spirometry 8/12 - normal.  Spirometry 7/26/13 - FVC is 2.87 L, FEV1 2.5 L, FEV1/FVC ratio 87%, interval decline in FVC and FEV1. PA and lateral CXR 7/22/13-no acute abnormality. Spirometry 7/10/2015 - normal, slight but statistically insignificant decline in FEV1. Spirometry 7/5/2016 - normal, no significant change. Spirometry 1/3/2018-normal, no significant interval change. Spirometry 11/29/2018-mild restrictive defect, interval decline in FVC and FEV1. Spirometry 6/7/2019- normal, interval improvement in FVC and FEV1. Spirometry 7/19/2021-mild restrictive defect/borderline normal, interval decline in FVC and FEV1.   Spirometry 7/8/2022-normal, interval improvement in FVC and FEV1

## 2023-01-27 ENCOUNTER — OFFICE VISIT (OUTPATIENT)
Dept: PULMONOLOGY | Age: 58
End: 2023-01-27
Payer: COMMERCIAL

## 2023-01-27 VITALS
BODY MASS INDEX: 27.63 KG/M2 | WEIGHT: 193 LBS | DIASTOLIC BLOOD PRESSURE: 88 MMHG | OXYGEN SATURATION: 100 % | HEART RATE: 76 BPM | HEIGHT: 70 IN | SYSTOLIC BLOOD PRESSURE: 136 MMHG | TEMPERATURE: 97.6 F

## 2023-01-27 DIAGNOSIS — J30.89 CHRONIC NON-SEASONAL ALLERGIC RHINITIS: ICD-10-CM

## 2023-01-27 DIAGNOSIS — J45.30 MILD PERSISTENT ASTHMA, UNCOMPLICATED: Primary | ICD-10-CM

## 2023-01-27 LAB
EXPIRATORY TIME: NORMAL
FEF 25-75% %PRED-PRE: NORMAL
FEF 25-75% PRED: NORMAL
FEF 25-75%-PRE: NORMAL
FEV1 %PRED-PRE: 108 %
FEV1 PRED: NORMAL
FEV1/FVC %PRED-PRE: NORMAL
FEV1/FVC PRED: NORMAL
FEV1/FVC: 87 %
FEV1: 2.96 L
FVC %PRED-PRE: 98 %
FVC PRED: NORMAL
FVC: 3.4 L
PEF %PRED-PRE: NORMAL
PEF PRED: NORMAL
PEF-PRE: NORMAL

## 2023-01-27 PROCEDURE — 3079F DIAST BP 80-89 MM HG: CPT | Performed by: NURSE PRACTITIONER

## 2023-01-27 PROCEDURE — 3075F SYST BP GE 130 - 139MM HG: CPT | Performed by: NURSE PRACTITIONER

## 2023-01-27 PROCEDURE — 94010 BREATHING CAPACITY TEST: CPT | Performed by: NURSE PRACTITIONER

## 2023-01-27 PROCEDURE — 99213 OFFICE O/P EST LOW 20 MIN: CPT | Performed by: NURSE PRACTITIONER

## 2023-01-27 RX ORDER — MONTELUKAST SODIUM 10 MG/1
10 TABLET ORAL NIGHTLY
Qty: 90 TABLET | Refills: 3 | Status: SHIPPED | OUTPATIENT
Start: 2023-01-27 | End: 2024-01-22

## 2023-01-27 RX ORDER — ALBUTEROL SULFATE 90 UG/1
AEROSOL, METERED RESPIRATORY (INHALATION)
Qty: 18 G | Refills: 11 | Status: SHIPPED | OUTPATIENT
Start: 2023-01-27

## 2023-01-27 RX ORDER — FLUTICASONE FUROATE AND VILANTEROL TRIFENATATE 100; 25 UG/1; UG/1
POWDER RESPIRATORY (INHALATION)
Qty: 1 EACH | Refills: 11 | Status: SHIPPED | OUTPATIENT
Start: 2023-01-27

## 2023-01-27 ASSESSMENT — PULMONARY FUNCTION TESTS
FVC: 3.40
FEV1/FVC: 87
FEV1_PERCENT_PREDICTED_PRE: 108
FEV1: 2.96
FVC_PERCENT_PREDICTED_PRE: 98

## 2023-01-27 ASSESSMENT — ENCOUNTER SYMPTOMS: COUGH: 1

## 2023-01-27 NOTE — PATIENT INSTRUCTIONS
Continue Singulair, Flonase as prescribed. Begin OTC Zyrtec or Claritin 1 daily as needed for control of postnasal drainage. Albuterol 2 puffs 4 times daily if needed for shortness of breath, wheezing. Resume Breo 1 inhalation every morning, rinse mouth after use, if she develops symptoms and need for albuterol. Follow-up in 6 months with spirometry, sooner if needed. If stable at that time, consider annual and as needed visits.

## 2023-01-27 NOTE — PROGRESS NOTES
[address] Lauren Amor. [address] [facility], [address] [facility]  [phone]    Patient Name:  Alcira Bustos    YOB: 1965    Office Visit 2023      CHIEF COMPLAINT:      Chief Complaint   Patient presents with    Follow-up    Asthma         ASSESSMENT:   (Medical Decision Making)                                                                                                                                          Encounter Diagnoses   Name Primary? Mild persistent asthma, uncomplicated Yes    Chronic non-seasonal allergic rhinitis      She is stable symptomatically. She has not required breo in some time and reports that she has not required albuterol at all. Seasonal allergies are stable for the most part but does report sensation of postnasal drainage and clearing of throat every morning. PLAN:     Continue Singulair, Flonase as prescribed. Begin OTC Zyrtec or Claritin 1 daily as needed for control of postnasal drainage. Albuterol 2 puffs 4 times daily if needed for shortness of breath, wheezing. Resume Breo 1 inhalation every morning, rinse mouth after use, if she develops symptoms and need for albuterol. Follow-up in 6 months with spirometry, sooner if needed. If stable at that time, consider annual and as needed visits. Orders Placed This Encounter    Spirometry Without Bronchodilator    albuterol sulfate HFA (PROVENTIL;VENTOLIN;PROAIR) 108 (90 Base) MCG/ACT inhaler     Si puffs qid prn wheezing or shortness of breath     Dispense:  18 g     Refill:  11    montelukast (SINGULAIR) 10 MG tablet     Sig: Take 1 tablet by mouth nightly 1 po q hs     Dispense:  90 tablet     Refill:  3    BREO ELLIPTA 100-25 MCG/ACT AEPB     Si inhalation every morning, rinse mouth after use     Dispense:  1 each     Refill:  11           RUBINA MURGUIA, APRN - CNP    Total  time spent with patient -31 min.      Collaborating MD: Dr. Henok Su:    She is a 15-year-old female seen for follow-up of asthma, allergic rhinitis. Today, she reports that she is doing very well from a respiratory standpoint. Denies any significant shortness of breath, wheeze. She has minimal morning cough after clearing secretions from her upper airway. No purulent sputum or hemoptysis. She has not required Breo or albuterol at all since her last visit. 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 She is contemplating possible career change. DIAGNOSTICS:       PFT's 7/08 per primary MD - restrictive defect. Spirometry 8/08 - normal.  CXR 8/2008 - no acute cardiopulmonary process. Labs 8/08 IgE level - 82. Rast level and eosinophil were unremarkable. Methacholine challenge 9/08 - although not typical for positive criteria, there was a trend toward worsening and reversibility following bronchodilator. Spirometry 1/09 -- normal, significant improvement with initiation of Advair and Singulair. PFT's 11/09 - normal, improved. Spirometry 12/20/2011 - normal, interval improvement. PA and lateral CXR 12/20/11 - clear lung fields, no acute cardiopulmonary abnormality. Spirometry 8/12 - normal.  Spirometry 7/26/13 - FVC is 2.87 L, FEV1 2.5 L, FEV1/FVC ratio 87%, interval decline in FVC and FEV1. PA and lateral CXR 7/22/13-no acute abnormality. Spirometry 7/10/2015 - normal, slight but statistically insignificant decline in FEV1. Spirometry 7/5/2016 - normal, no significant change. Spirometry 1/3/2018-normal, no significant interval change. Spirometry 11/29/2018-mild restrictive defect, interval decline in FVC and FEV1. Spirometry 6/7/2019- normal, interval improvement in FVC and FEV1. Spirometry 7/19/2021-mild restrictive defect/borderline normal, interval decline in FVC and FEV1. Spirometry 7/8/2022-normal, interval improvement in FVC and FEV1.   Spirometry 1/27/2023-normal, coughed during study. _____________________________________________________________      REVIEW OF SYSTEMS:    Review of Systems   HENT:          Typical morning cough, postnasal drainage, clearing of throat. Respiratory:  Positive for cough. PHYSICAL EXAM:    Vitals:    01/27/23 0841   BP: 136/88   Pulse: 76   Temp: 97.6 °F (36.4 °C)   TempSrc: Temporal   SpO2: 100%   Weight: 193 lb (87.5 kg)   Height: 5' 10\" (1.778 m)        Body mass index is 27.69 kg/m². GENERAL APPEARANCE:  The patient is normal weight and in no respiratory distress. HEENT:  PERRL. Conjunctivae unremarkable. Posterior oropharynx is moist, pink, no exudate or lesions. NECK/LYMPHATIC:   Symmetrical with no elevation of jugular venous pulsation. Trachea midline. LUNGS:   Normal respiratory effort with symmetrical lung expansion. Breath sounds-decreased but clear. [place] HEART:   There is a normal rate and regular rhythm. No murmur, rub, or gallop. There is no edema in the lower extremities. NEURO:  The patient is alert and oriented to person, place, and time. Memory appears intact and mood is normal.  No gross sensorimotor deficits are present. DIAGNOSTIC TESTS: Studies were personally reviewed by me and discussed with the patient.     Spirometry:        Office Spirometry Results Latest Ref Rng & Units 1/27/2023 7/8/2022   FVC L 3.40 2.83   FEV1 L 2.96 2.37   FEV1 %PRED-PRE % 108 86   FVC %PRED-PRE % 98 81   FEV1/FVC % 87 84           Past Medical History:   Diagnosis Date    Asthma     Depression 02/2017    Hives     Hx of esophagogastroduodenoscopy 2007    secondary to dysphagia    Hypertension     no medication at this time    Reactive airway disease     Urticaria        Patient Active Problem List   Diagnosis    Postsurgical hypothyroidism    HTN (hypertension)    Hives    Chronic non-seasonal allergic rhinitis    Graves' ophthalmopathy    Papillary thyroid carcinoma (HCC)    Mild persistent asthma without complication    Palpitations       Past Surgical History:   Procedure Laterality Date    COLONOSCOPY N/A 7/6/2018    COLONOSCOPY  BMI 27 performed by Atiya Brody MD at [address] [facility] FLX W/RMVL OF TUMOR POLYP LESION SNARE TQ  7/6/2018         HYSTERECTOMY (CERVIX STATUS UNKNOWN)  2012    TONSILLECTOMY  1973    US LYMPH NODE BIOPSY  9/20/2018    US LYMPH NODE BIOPSY 9/20/2018 [facility] [address] BIOPSY  9/20/2018    US LYMPH NODE BIOPSY 9/20/2018 [facility] RADIOLOGY US         Social History     Socioeconomic History    Marital status:      Spouse name: None    Number of children: None    Years of education: None    Highest education level: None   Tobacco Use    Smoking status: Never    Smokeless tobacco: Never   Substance and Sexual Activity    Alcohol use: Yes     Alcohol/week: 0.0 standard drinks    Drug use: Never   Social History Narrative    No known toxic industrial or environmental exposure. She has lived in [facility] and [place]. She is employed as a teacher in [employer]. She is  and resides in [place]        Family History   Problem Relation Age of Onset    Thyroid Cancer Neg Hx     Lung Disease Neg Hx     Thyroid Disease Neg Hx        No Known Allergies    Current Outpatient Medications   Medication Sig    atorvastatin (LIPITOR) 10 MG tablet TAKE 1 TABLET BY MOUTH EVERY DAY FOR 30 DAYS    BIOTIN PO Take by mouth    SYNTHROID 150 MCG tablet Take 1 tablet by mouth Daily    Selenium 200 MCG CAPS Take 1 capsule by mouth daily    albuterol sulfate HFA (PROVENTIL;VENTOLIN;PROAIR) 108 (90 Base) MCG/ACT inhaler 2 puffs qid prn wheezing or shortness of breath    fluticasone-vilanterol (BREO ELLIPTA) 100-25 MCG/INH AEPB inhaler 1 inhalation every morning, rinse mouth after use    fluticasone (FLONASE) 50 MCG/ACT nasal spray 2 sprays by Nasal route daily    montelukast (SINGULAIR) 10 MG tablet Take 1 tablet by mouth nightly 1 po q hs clindamycin (CLEOCIN T) 1 % external solution Use thin film on affected area    Eflornithine HCl 13.9 % CREA Apply 1 each topically 2 times daily    hydroCHLOROthiazide (HYDRODIURIL) 25 MG tablet Take 25 mg by mouth daily    hydrocortisone (HYTONE) 2.5 % lotion APPLY TO AFFECTED AREA TWICE A DAY FOR 7 DAYS    LORazepam (ATIVAN) 0.5 MG tablet 1 tablet at bedtime as needed    metoprolol succinate (TOPROL XL) 50 MG extended release tablet Take 50 mg by mouth daily     No current facility-administered medications for this visit. Over 50% of today's office visit was spent in face to face time reviewing test results, prognosis, importance of compliance, education about disease process, benefits of medications, instructions for management of acute symptoms, and follow up plans. Electronically signed. Dictated using voice recognition software.   Proof read but unrecognized errors may exist.

## 2023-04-13 PROBLEM — E07.9 THYROID EYE DISEASE: Status: ACTIVE | Noted: 2018-08-16

## 2023-04-13 PROBLEM — H57.89 THYROID EYE DISEASE: Status: ACTIVE | Noted: 2018-08-16

## 2023-05-31 ENCOUNTER — TRANSCRIBE ORDERS (OUTPATIENT)
Dept: SCHEDULING | Age: 58
End: 2023-05-31

## 2023-05-31 DIAGNOSIS — M25.562 LEFT KNEE PAIN, UNSPECIFIED CHRONICITY: Primary | ICD-10-CM

## 2023-06-01 ENCOUNTER — HOSPITAL ENCOUNTER (OUTPATIENT)
Dept: ULTRASOUND IMAGING | Age: 58
Discharge: HOME | End: 2023-06-01
Payer: COMMERCIAL

## 2023-06-01 DIAGNOSIS — M25.562 LEFT KNEE PAIN, UNSPECIFIED CHRONICITY: ICD-10-CM

## 2023-06-01 PROCEDURE — 93971 EXTREMITY STUDY: CPT

## 2023-06-06 ENCOUNTER — HOSPITAL ENCOUNTER (OUTPATIENT)
Dept: ULTRASOUND IMAGING | Age: 58
Discharge: HOME | End: 2023-06-09

## 2023-06-06 DIAGNOSIS — C73 PAPILLARY THYROID CARCINOMA (HCC): ICD-10-CM

## 2023-06-21 LAB — TSH SERPL DL<=0.005 MIU/L-ACNC: 1.97 UIU/ML (ref 0.45–4.5)

## 2023-07-13 ENCOUNTER — OFFICE VISIT (OUTPATIENT)
Dept: PULMONOLOGY | Age: 58
End: 2023-07-13
Payer: COMMERCIAL

## 2023-07-13 VITALS
WEIGHT: 196 LBS | DIASTOLIC BLOOD PRESSURE: 74 MMHG | SYSTOLIC BLOOD PRESSURE: 122 MMHG | HEART RATE: 85 BPM | HEIGHT: 70 IN | TEMPERATURE: 98.3 F | BODY MASS INDEX: 28.06 KG/M2 | OXYGEN SATURATION: 95 %

## 2023-07-13 DIAGNOSIS — J30.89 CHRONIC NON-SEASONAL ALLERGIC RHINITIS: ICD-10-CM

## 2023-07-13 DIAGNOSIS — J45.30 MILD PERSISTENT ASTHMA, UNCOMPLICATED: Primary | ICD-10-CM

## 2023-07-13 LAB
EXPIRATORY TIME: NORMAL
FEF 25-75% %PRED-PRE: NORMAL
FEF 25-75% PRED: NORMAL
FEF 25-75%-PRE: NORMAL
FEV1 %PRED-PRE: 89 %
FEV1 PRED: NORMAL
FEV1/FVC %PRED-PRE: NORMAL
FEV1/FVC PRED: NORMAL
FEV1/FVC: 86 %
FEV1: 2.45 L
FVC %PRED-PRE: 82 %
FVC PRED: NORMAL
FVC: 2.83 L
PEF %PRED-PRE: NORMAL
PEF PRED: NORMAL
PEF-PRE: NORMAL

## 2023-07-13 PROCEDURE — 3078F DIAST BP <80 MM HG: CPT | Performed by: NURSE PRACTITIONER

## 2023-07-13 PROCEDURE — 99214 OFFICE O/P EST MOD 30 MIN: CPT | Performed by: NURSE PRACTITIONER

## 2023-07-13 PROCEDURE — 94010 BREATHING CAPACITY TEST: CPT | Performed by: INTERNAL MEDICINE

## 2023-07-13 PROCEDURE — 3074F SYST BP LT 130 MM HG: CPT | Performed by: NURSE PRACTITIONER

## 2023-07-13 RX ORDER — FLUTICASONE FUROATE AND VILANTEROL TRIFENATATE 100; 25 UG/1; UG/1
POWDER RESPIRATORY (INHALATION)
Qty: 1 EACH | Refills: 11 | Status: SHIPPED | OUTPATIENT
Start: 2023-07-13

## 2023-07-13 RX ORDER — MONTELUKAST SODIUM 10 MG/1
10 TABLET ORAL NIGHTLY
Qty: 90 TABLET | Refills: 3 | Status: SHIPPED | OUTPATIENT
Start: 2023-07-13 | End: 2024-07-07

## 2023-07-13 RX ORDER — ALBUTEROL SULFATE 90 UG/1
AEROSOL, METERED RESPIRATORY (INHALATION)
Qty: 18 G | Refills: 11 | Status: SHIPPED | OUTPATIENT
Start: 2023-07-13

## 2023-07-13 RX ORDER — FLUTICASONE PROPIONATE 50 MCG
2 SPRAY, SUSPENSION (ML) NASAL DAILY
Qty: 3 EACH | Refills: 3 | Status: SHIPPED | OUTPATIENT
Start: 2023-07-13

## 2023-07-13 ASSESSMENT — PULMONARY FUNCTION TESTS
FEV1_PERCENT_PREDICTED_PRE: 89
FVC_PERCENT_PREDICTED_PRE: 82
FEV1: 2.45
FEV1/FVC: 86
FVC: 2.83

## 2023-07-13 ASSESSMENT — ENCOUNTER SYMPTOMS
SPUTUM PRODUCTION: 1
WHEEZING: 0
SHORTNESS OF BREATH: 0

## 2023-07-13 NOTE — PROGRESS NOTES
She is a 60-year-old female seen for follow-up of asthma, allergic rhinitis. DIAGNOSTICS:       PFT's 7/08 per primary MD - restrictive defect. Spirometry 8/08 - normal.  CXR 8/2008 - no acute cardiopulmonary process. Labs 8/08 IgE level - 82. Rast level and eosinophil were unremarkable. Methacholine challenge 9/08 - although not typical for positive criteria, there was a trend toward worsening and reversibility following bronchodilator. Spirometry 1/09 -- normal, significant improvement with initiation of Advair and Singulair. PFT's 11/09 - normal, improved. Spirometry 12/20/2011 - normal, interval improvement. PA and lateral CXR 12/20/11 - clear lung fields, no acute cardiopulmonary abnormality. Spirometry 8/12 - normal.  Spirometry 7/26/13 - FVC is 2.87 L, FEV1 2.5 L, FEV1/FVC ratio 87%, interval decline in FVC and FEV1. PA and lateral CXR 7/22/13-no acute abnormality. Spirometry 7/10/2015 - normal, slight but statistically insignificant decline in FEV1. Spirometry 7/5/2016 - normal, no significant change. Spirometry 1/3/2018-normal, no significant interval change. Spirometry 11/29/2018-mild restrictive defect, interval decline in FVC and FEV1. Spirometry 6/7/2019- normal, interval improvement in FVC and FEV1. Spirometry 7/19/2021-mild restrictive defect/borderline normal, interval decline in FVC and FEV1. Spirometry 7/8/2022-normal, interval improvement in FVC and FEV1. Spirometry 1/27/2023-normal, coughed during study.

## 2023-07-13 NOTE — PATIENT INSTRUCTIONS
Continue Singulair, Flonase as prescribed. OTC Zyrtec or Claritin 1 daily as needed for control of postnasal drainage. Albuterol 2 puffs 4 times daily if needed for shortness of breath, wheezing. Resume Breo 1 inhalation every morning, rinse mouth after use, if she develops symptoms and need for albuterol.

## 2023-07-13 NOTE — PROGRESS NOTES
Cherri Mail , [facility]. [address] [facility], [address] [place] [address]  [phone]    Patient Name:  Harjinder Horne    YOB: 1965    Office Visit 2023      CHIEF COMPLAINT:      Chief Complaint   Patient presents with    Asthma    Follow-up         ASSESSMENT:   (Medical Decision Making)                                                                                                                                          Encounter Diagnoses   Name Primary? Mild persistent asthma, uncomplicated Yes    Chronic non-seasonal allergic rhinitis      She is stable symptomatically. She has not required albuterol inhaler and has not required Breo in some time. Allergy symptoms are currently controlled. PLAN:     Continue Singulair, Flonase as prescribed. OTC Zyrtec or Claritin 1 daily as needed for control of postnasal drainage. Albuterol 2 puffs 4 times daily if needed for shortness of breath, wheezing. Resume Breo 1 inhalation every morning, rinse mouth after use, if she develops symptoms and need for albuterol. Orders Placed This Encounter   Procedures    Spirometry Without Bronchodilator       Orders Placed This Encounter   Medications    albuterol sulfate HFA (PROVENTIL;VENTOLIN;PROAIR) 108 (90 Base) MCG/ACT inhaler     Si puffs qid prn wheezing or shortness of breath     Dispense:  18 g     Refill:  11    BREO ELLIPTA 100-25 MCG/ACT inhaler     Si inhalation every morning, rinse mouth after use     Dispense:  1 each     Refill:  11    fluticasone (FLONASE) 50 MCG/ACT nasal spray     Si sprays by Nasal route daily     Dispense:  3 each     Refill:  3    montelukast (SINGULAIR) 10 MG tablet     Sig: Take 1 tablet by mouth nightly 1 po q hs     Dispense:  90 tablet     Refill:  3           SYDNIE LOPEZ - CHANTELL    Total  time spent with patient - 30 min.      Collaborating MD: Dr. Elizabeth Shaikh

## 2023-09-07 LAB — TSH SERPL DL<=0.005 MIU/L-ACNC: 0.74 UIU/ML (ref 0.45–4.5)

## 2023-09-18 ENCOUNTER — OFFICE VISIT (OUTPATIENT)
Dept: ENDOCRINOLOGY | Age: 58
End: 2023-09-18
Payer: COMMERCIAL

## 2023-09-18 VITALS
SYSTOLIC BLOOD PRESSURE: 118 MMHG | OXYGEN SATURATION: 97 % | DIASTOLIC BLOOD PRESSURE: 72 MMHG | HEART RATE: 82 BPM | BODY MASS INDEX: 27.84 KG/M2 | WEIGHT: 194 LBS

## 2023-09-18 DIAGNOSIS — E89.0 POSTSURGICAL HYPOTHYROIDISM: ICD-10-CM

## 2023-09-18 DIAGNOSIS — E07.9 THYROID EYE DISEASE: ICD-10-CM

## 2023-09-18 DIAGNOSIS — C73 PAPILLARY THYROID CARCINOMA (HCC): Primary | ICD-10-CM

## 2023-09-18 DIAGNOSIS — H57.89 THYROID EYE DISEASE: ICD-10-CM

## 2023-09-18 PROCEDURE — 3074F SYST BP LT 130 MM HG: CPT | Performed by: INTERNAL MEDICINE

## 2023-09-18 PROCEDURE — 3078F DIAST BP <80 MM HG: CPT | Performed by: INTERNAL MEDICINE

## 2023-09-18 PROCEDURE — 99214 OFFICE O/P EST MOD 30 MIN: CPT | Performed by: INTERNAL MEDICINE

## 2023-09-18 RX ORDER — LEVOTHYROXINE SODIUM 150 MCG
TABLET ORAL
Qty: 100 TABLET | Refills: 3 | Status: SHIPPED | OUTPATIENT
Start: 2023-09-18

## 2023-09-18 ASSESSMENT — ENCOUNTER SYMPTOMS
CONSTIPATION: 0
DIARRHEA: 0

## 2023-09-18 NOTE — PROGRESS NOTES
Jeffery Lucio MD, [facility]  [address] [facility], [address] [place] [address], [address]  Phone [phone]  Facsimile [phone]          Yesenia Chavez is a 65 y.o. female seen 9/18/2023 for follow up of thyroid cancer and hypothyroidism        ASSESSMENT AND PLAN:    1. Papillary thyroid carcinoma (HCC)  Multifocal papillary thyroid carcinoma involving the right lobe with the largest tumor measuring 1.2 cm with central lymph node metastases with extranodal extension ([id number]) status post total thyroidectomy and central lymphadenectomy 11/2018, status post WING-131 151 mCi 2/2019. Thyrogen stimulated whole-body scan negative 7/2020. Her stimulated thyroglobulin level was very low as well. Neck ultrasound stable 6/2023. Her thyroglobulin level has been negative, most recently 4/2023. I will monitor her thyroglobulin level once a year. 2. Postsurgical hypothyroidism  Goal TSH 0.5-2.0 (although 0.1-0.5 is acceptable). - SYNTHROID 150 MCG tablet; 1 tablet once a day with an extra 1/2 tablet on Sundays  Dispense: 100 tablet; Refill: 3    3. Thyroid eye disease  We have discussed the data regarding selenium supplementation. Followed by Dr. Jogre Baker 200 MCG CAPS; Take 1 capsule by mouth daily  Dispense: 1 capsule; Refill: 0      Follow-up and Dispositions    Return in about 6 months (around 3/18/2024).          HISTORY OF PRESENT ILLNESS:    THYROID CANCER     Presentation: Multiple thyroid nodules in a background of Graves' disease status post FNA biopsy revealing papillary thyroid carcinoma, status post total thyroidectomy and central lymphadenectomy 11/9/2018 (pathology revealed multifocal papillary thyroid carcinoma involving the right lobe with the foci measuring 0.6 cm, 1.1 cm and 1.2 cm; negative margins, no angioinvasion, +lymphatic invasion, no perineural invasion, no extrathyroidal extension; 5 out of 6 level 6 lymph nodes

## 2024-04-02 ENCOUNTER — OFFICE VISIT (OUTPATIENT)
Dept: ENDOCRINOLOGY | Age: 59
End: 2024-04-02
Payer: COMMERCIAL

## 2024-04-02 VITALS
DIASTOLIC BLOOD PRESSURE: 78 MMHG | SYSTOLIC BLOOD PRESSURE: 120 MMHG | OXYGEN SATURATION: 98 % | WEIGHT: 186 LBS | HEART RATE: 67 BPM | BODY MASS INDEX: 26.69 KG/M2

## 2024-04-02 DIAGNOSIS — H57.89 THYROID EYE DISEASE: ICD-10-CM

## 2024-04-02 DIAGNOSIS — E07.9 THYROID EYE DISEASE: ICD-10-CM

## 2024-04-02 DIAGNOSIS — E89.0 POSTSURGICAL HYPOTHYROIDISM: ICD-10-CM

## 2024-04-02 DIAGNOSIS — Z85.850 HISTORY OF THYROID CANCER: Primary | ICD-10-CM

## 2024-04-02 LAB
T4 FREE SERPL-MCNC: 1.1 NG/DL (ref 0.82–1.77)
TSH SERPL DL<=0.005 MIU/L-ACNC: 11.3 UIU/ML (ref 0.45–4.5)

## 2024-04-02 PROCEDURE — 3078F DIAST BP <80 MM HG: CPT | Performed by: INTERNAL MEDICINE

## 2024-04-02 PROCEDURE — 99214 OFFICE O/P EST MOD 30 MIN: CPT | Performed by: INTERNAL MEDICINE

## 2024-04-02 PROCEDURE — 3074F SYST BP LT 130 MM HG: CPT | Performed by: INTERNAL MEDICINE

## 2024-04-02 RX ORDER — LEVOTHYROXINE SODIUM 175 MCG
175 TABLET ORAL DAILY
Qty: 90 TABLET | Refills: 3 | Status: SHIPPED | OUTPATIENT
Start: 2024-04-02

## 2024-04-02 ASSESSMENT — ENCOUNTER SYMPTOMS
DIARRHEA: 0
CONSTIPATION: 0
ROS SKIN COMMENTS: SHE REPORTS HAIR LOSS.  DENIES DRY SKIN.

## 2024-04-10 LAB
THYROGLOB AB SERPL-ACNC: 4.9 IU/ML (ref 0–0.9)
THYROGLOB SERPL-MCNC: <2 NG/ML

## 2024-07-03 NOTE — PROGRESS NOTES
[facility]  [address]  [place] [address]  [phone]    Patient Name:  Jeannee Abercrombie Johnson    YOB: 1965    Office Visit 2024      CHIEF COMPLAINT:      Chief Complaint   Patient presents with    Follow-up    Asthma         ASSESSMENT:   (Medical Decision Making)                                                                                                                                          Encounter Diagnoses   Name Primary?    Mild persistent asthma without complication Yes    Chronic non-seasonal allergic rhinitis      She is stable symptomatically, compliant with maintenance inhaler.  She has not required use of albuterol recently.    Allergy symptoms are currently controlled.                      PLAN:     Continue Breo 1 inhalation every morning, rinse mouth after use.    Albuterol 2 puffs 4 times daily if needed for shortness of breath or wheezing.    Continue Singulair, Flonase as prescribed.    OTC Zyrtec or Claritin, 1 daily as needed.    OTC mucolytic, (mucinex or generic equivalent of guaifenesin), 2400mg in 24 hours in divided doses as needed to thin mucous.    Follow-up in 11 months with spirometry, sooner if needed.    Orders Placed This Encounter   Procedures    Spirometry Without Bronchodilator       Orders Placed This Encounter   Medications    albuterol sulfate HFA (PROVENTIL;VENTOLIN;PROAIR) 108 (90 Base) MCG/ACT inhaler     Si puffs qid prn wheezing or shortness of breath     Dispense:  18 g     Refill:  11    BREO ELLIPTA 100-25 MCG/ACT inhaler     Si inhalation every morning, rinse mouth after use     Dispense:  1 each     Refill:  11    montelukast (SINGULAIR) 10 MG tablet     Sig: Take 1 tablet by mouth nightly 1 po q hs     Dispense:  90 tablet     Refill:  3    fluticasone (FLONASE) 50 MCG/ACT nasal spray     Si sprays by Nasal route daily     Dispense:  3 each     Refill:  3           RUBINA P

## 2024-07-05 ENCOUNTER — OFFICE VISIT (OUTPATIENT)
Dept: PULMONOLOGY | Age: 59
End: 2024-07-05
Payer: COMMERCIAL

## 2024-07-05 VITALS
BODY MASS INDEX: 28.44 KG/M2 | WEIGHT: 192 LBS | SYSTOLIC BLOOD PRESSURE: 122 MMHG | RESPIRATION RATE: 18 BRPM | HEART RATE: 68 BPM | DIASTOLIC BLOOD PRESSURE: 76 MMHG | TEMPERATURE: 97.2 F | OXYGEN SATURATION: 96 % | HEIGHT: 69 IN

## 2024-07-05 DIAGNOSIS — J45.30 MILD PERSISTENT ASTHMA WITHOUT COMPLICATION: Primary | Chronic | ICD-10-CM

## 2024-07-05 DIAGNOSIS — J30.89 CHRONIC NON-SEASONAL ALLERGIC RHINITIS: ICD-10-CM

## 2024-07-05 LAB
EXPIRATORY TIME: NORMAL
FEF 25-75% %PRED-PRE: NORMAL
FEF 25-75% PRED: NORMAL
FEF 25-75-PRE: NORMAL
FEV1 %PRED-PRE: 94 %
FEV1 PRED: 2.59 L
FEV1/FVC %PRED-PRE: NORMAL
FEV1/FVC PRED: 109 %
FEV1/FVC: 87 %
FEV1: 2.43 L
FVC %PRED-PRE: 85 %
FVC PRED: 3.29 L
FVC: 2.8 L
PEF %PRED-PRE: NORMAL
PEF PRED: NORMAL
PEF-PRE: NORMAL

## 2024-07-05 PROCEDURE — 3074F SYST BP LT 130 MM HG: CPT | Performed by: NURSE PRACTITIONER

## 2024-07-05 PROCEDURE — 3078F DIAST BP <80 MM HG: CPT | Performed by: NURSE PRACTITIONER

## 2024-07-05 PROCEDURE — 99214 OFFICE O/P EST MOD 30 MIN: CPT | Performed by: NURSE PRACTITIONER

## 2024-07-05 RX ORDER — MONTELUKAST SODIUM 10 MG/1
10 TABLET ORAL NIGHTLY
Qty: 90 TABLET | Refills: 3 | Status: SHIPPED | OUTPATIENT
Start: 2024-07-05 | End: 2025-06-30

## 2024-07-05 RX ORDER — ALBUTEROL SULFATE 90 UG/1
AEROSOL, METERED RESPIRATORY (INHALATION)
Qty: 18 G | Refills: 11 | Status: SHIPPED | OUTPATIENT
Start: 2024-07-05

## 2024-07-05 RX ORDER — FLUTICASONE FUROATE AND VILANTEROL TRIFENATATE 100; 25 UG/1; UG/1
POWDER RESPIRATORY (INHALATION)
Qty: 1 EACH | Refills: 11 | Status: SHIPPED | OUTPATIENT
Start: 2024-07-05

## 2024-07-05 RX ORDER — FLUTICASONE PROPIONATE 50 MCG
2 SPRAY, SUSPENSION (ML) NASAL DAILY
Qty: 3 EACH | Refills: 3 | Status: SHIPPED | OUTPATIENT
Start: 2024-07-05

## 2024-07-05 ASSESSMENT — ENCOUNTER SYMPTOMS
WHEEZING: 0
HEMOPTYSIS: 0
COUGH: 1
SPUTUM PRODUCTION: 1
SHORTNESS OF BREATH: 1

## 2024-07-05 ASSESSMENT — PULMONARY FUNCTION TESTS
FVC_PREDICTED: 3.29
FVC: 2.80
FEV1_PERCENT_PREDICTED_PRE: 94
FVC_PERCENT_PREDICTED_PRE: 85
FEV1/FVC_PREDICTED: 109
FEV1_PREDICTED: 2.59
FEV1: 2.43
FEV1/FVC: 87

## 2024-07-05 NOTE — PATIENT INSTRUCTIONS
Continue Breo 1 inhalation every morning, rinse mouth after use.    Albuterol 2 puffs 4 times daily if needed for shortness of breath or wheezing.    Continue Singulair, Flonase as prescribed.    OTC Zyrtec or Claritin, 1 daily as needed.    OTC mucolytic, (mucinex or generic equivalent of guaifenesin), 2400mg in 24 hours in divided doses as needed to thin mucous.    Follow-up in 11 months with spirometry, sooner if needed.

## 2025-01-09 LAB
T4 FREE SERPL-MCNC: 0.98 NG/DL (ref 0.82–1.77)
TSH SERPL DL<=0.005 MIU/L-ACNC: 27.3 UIU/ML (ref 0.45–4.5)

## 2025-01-13 ENCOUNTER — TELEPHONE (OUTPATIENT)
Dept: ENDOCRINOLOGY | Age: 60
End: 2025-01-13

## 2025-01-14 ENCOUNTER — OFFICE VISIT (OUTPATIENT)
Dept: ENDOCRINOLOGY | Age: 60
End: 2025-01-14
Payer: COMMERCIAL

## 2025-01-14 VITALS
HEART RATE: 76 BPM | HEIGHT: 70 IN | RESPIRATION RATE: 18 BRPM | WEIGHT: 192 LBS | SYSTOLIC BLOOD PRESSURE: 132 MMHG | DIASTOLIC BLOOD PRESSURE: 76 MMHG | OXYGEN SATURATION: 98 % | BODY MASS INDEX: 27.49 KG/M2

## 2025-01-14 DIAGNOSIS — H57.89 THYROID EYE DISEASE: ICD-10-CM

## 2025-01-14 DIAGNOSIS — E07.9 THYROID EYE DISEASE: ICD-10-CM

## 2025-01-14 DIAGNOSIS — Z85.850 HISTORY OF THYROID CANCER: Primary | ICD-10-CM

## 2025-01-14 DIAGNOSIS — E89.0 POSTSURGICAL HYPOTHYROIDISM: ICD-10-CM

## 2025-01-14 PROCEDURE — 99214 OFFICE O/P EST MOD 30 MIN: CPT | Performed by: INTERNAL MEDICINE

## 2025-01-14 PROCEDURE — 3075F SYST BP GE 130 - 139MM HG: CPT | Performed by: INTERNAL MEDICINE

## 2025-01-14 PROCEDURE — 3078F DIAST BP <80 MM HG: CPT | Performed by: INTERNAL MEDICINE

## 2025-01-14 RX ORDER — CYANOCOBALAMIN (VITAMIN B-12) 1000 MCG
1 TABLET, EXTENDED RELEASE ORAL DAILY
Qty: 1 CAPSULE | Refills: 0
Start: 2025-01-14

## 2025-01-14 RX ORDER — LEVOTHYROXINE SODIUM 175 MCG
TABLET ORAL
Qty: 100 TABLET | Refills: 3 | Status: SHIPPED | OUTPATIENT
Start: 2025-01-14

## 2025-01-14 ASSESSMENT — ENCOUNTER SYMPTOMS
CONSTIPATION: 0
ROS SKIN COMMENTS: DENIES HAIR LOSS, DRY SKIN.
DIARRHEA: 0

## 2025-01-14 NOTE — PROGRESS NOTES
Jeffery Varela MD, [facility]  [address] [place] [address], [facility] [address]  [place] [address]  Phone [phone]  Facsimile [phone]          Jeannee Abercrombie Johnson is a 59 y.o. female seen 1/14/2025 for follow up of thyroid cancer and hypothyroidism        ASSESSMENT AND PLAN:    1. 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  Thyrogen stimulated whole-body scan negative 7/2020.  Her stimulated thyroglobulin level was very low as well.  Neck ultrasound stable 6/2023.  Her thyroglobulin level has been negative, most recently 4/2024.  I will repeat her thyroglobulin level prior to next visit.    2. Postsurgical hypothyroidism  Goal TSH 0.5-2.0 (although 0.1-0.5 is acceptable).  She is currently under replaced and I will increase her Synthroid as below.  Follow up in 4 months.    - SYNTHROID 175 MCG tablet; 1 tablet once a day with an extra 1/2 tablet on Sundays  Dispense: 100 tablet; Refill: 3    3. Thyroid eye disease  We have discussed the data regarding selenium supplementation.  Followed by Dr. Sarabia.    - Selenium 200 MCG CAPS; Take 1 capsule by mouth daily  Dispense: 1 capsule; Refill: 0      Follow-up and Dispositions    Return in about 4 months (around 5/14/2025).         HISTORY OF PRESENT ILLNESS:    THYROID CANCER     Presentation: Multiple thyroid nodules in a background of Graves' disease status post FNA biopsy revealing papillary thyroid carcinoma, status post total thyroidectomy and central lymphadenectomy 11/9/2018 (pathology revealed multifocal papillary thyroid carcinoma involving the right lobe with the foci measuring 0.6 cm, 1.1 cm and 1.2 cm; negative margins, no

## 2025-02-11 ENCOUNTER — APPOINTMENT (OUTPATIENT)
Dept: URBAN - METROPOLITAN AREA CLINIC 329 | Facility: CLINIC | Age: 60
Setting detail: DERMATOLOGY
End: 2025-02-11

## 2025-02-11 PROBLEM — D23.62 OTHER BENIGN NEOPLASM OF SKIN OF LEFT UPPER LIMB, INCLUDING SHOULDER: Status: ACTIVE | Noted: 2025-02-11

## 2025-02-11 PROCEDURE — ? COUNSELING

## 2025-02-11 PROCEDURE — 99202 OFFICE O/P NEW SF 15 MIN: CPT

## 2025-02-11 PROCEDURE — ? FULL BODY SKIN EXAM - DECLINED

## 2025-04-09 ENCOUNTER — TRANSCRIBE ORDERS (OUTPATIENT)
Dept: SCHEDULING | Age: 60
End: 2025-04-09

## 2025-04-09 DIAGNOSIS — M79.605 LEFT LEG PAIN: Primary | ICD-10-CM

## 2025-04-10 ENCOUNTER — HOSPITAL ENCOUNTER (OUTPATIENT)
Dept: ULTRASOUND IMAGING | Age: 60
Discharge: HOME | End: 2025-04-12
Payer: COMMERCIAL

## 2025-04-10 DIAGNOSIS — M79.605 LEFT LEG PAIN: ICD-10-CM

## 2025-04-10 PROCEDURE — 93971 EXTREMITY STUDY: CPT

## 2025-05-30 DIAGNOSIS — Z85.850 HISTORY OF THYROID CANCER: ICD-10-CM

## 2025-05-30 DIAGNOSIS — E89.0 POSTSURGICAL HYPOTHYROIDISM: ICD-10-CM

## 2025-05-30 LAB
T4 FREE SERPL-MCNC: 1.8 NG/DL (ref 0.9–1.7)
TSH W FREE THYROID IF ABNORMAL: 0.02 UIU/ML (ref 0.27–4.2)

## 2025-06-01 LAB
THYROGLOB AB SERPL-ACNC: <1 IU/ML (ref 0–0.9)
THYROGLOBULIN: <0.1 NG/ML (ref 1.5–38.5)

## 2025-06-03 ENCOUNTER — OFFICE VISIT (OUTPATIENT)
Dept: ENDOCRINOLOGY | Age: 60
End: 2025-06-03
Payer: COMMERCIAL

## 2025-06-03 VITALS
HEIGHT: 70 IN | SYSTOLIC BLOOD PRESSURE: 146 MMHG | OXYGEN SATURATION: 98 % | WEIGHT: 188 LBS | BODY MASS INDEX: 26.92 KG/M2 | HEART RATE: 72 BPM | RESPIRATION RATE: 16 BRPM | DIASTOLIC BLOOD PRESSURE: 84 MMHG

## 2025-06-03 DIAGNOSIS — E07.9 THYROID EYE DISEASE: ICD-10-CM

## 2025-06-03 DIAGNOSIS — Z85.850 HISTORY OF THYROID CANCER: Primary | ICD-10-CM

## 2025-06-03 DIAGNOSIS — H57.89 THYROID EYE DISEASE: ICD-10-CM

## 2025-06-03 DIAGNOSIS — E89.0 POSTSURGICAL HYPOTHYROIDISM: ICD-10-CM

## 2025-06-03 PROCEDURE — 3079F DIAST BP 80-89 MM HG: CPT | Performed by: INTERNAL MEDICINE

## 2025-06-03 PROCEDURE — 99214 OFFICE O/P EST MOD 30 MIN: CPT | Performed by: INTERNAL MEDICINE

## 2025-06-03 PROCEDURE — 3077F SYST BP >= 140 MM HG: CPT | Performed by: INTERNAL MEDICINE

## 2025-06-03 RX ORDER — LEVOTHYROXINE SODIUM 175 MCG
175 TABLET ORAL DAILY
Qty: 90 TABLET | Refills: 3 | Status: SHIPPED | OUTPATIENT
Start: 2025-06-03

## 2025-06-03 RX ORDER — CALCIUM CARB, CITRATE, MALATE 250 MG
1 CAPSULE ORAL DAILY
Qty: 1 CAPSULE | Refills: 0
Start: 2025-06-03

## 2025-06-03 ASSESSMENT — ENCOUNTER SYMPTOMS
DIARRHEA: 0
ROS SKIN COMMENTS: DENIES HAIR LOSS, DRY SKIN.
CONSTIPATION: 0

## 2025-06-03 NOTE — PROGRESS NOTES
Jeffery Varela MD, [facility]  [address] [place] [address], [facility] [address]  [place] [address]  Phone [phone]  Facsimile [phone]          Jeannee Abercrombie Johnson is a 59 y.o. female seen 6/3/2025 for follow up of thyroid cancer and hypothyroidism        ASSESSMENT AND PLAN:    1. 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  Thyrogen stimulated whole-body scan negative 7/2020.  Her stimulated thyroglobulin level was very low as well.  Neck ultrasound stable 6/2023.  Her thyroglobulin level has been negative, most recently 5/2025.  I will repeat her thyroglobulin level once a year.    2. Postsurgical hypothyroidism  Goal TSH 0.5-2.0 (although 0.1-0.5 is acceptable).  She is currently over replaced and I will decrease her Synthroid as below.  Follow up in 3 months.    - SYNTHROID 175 MCG tablet; Take 1 tablet by mouth Daily  Dispense: 90 tablet; Refill: 3    3. Thyroid eye disease  We have discussed the data regarding selenium supplementation.  Followed by Dr. Sarabia.    - Selenium 200 MCG CAPS; Take 1 capsule by mouth daily  Dispense: 1 capsule; Refill: 0      Follow-up and Dispositions    Return in about 3 months (around 9/3/2025), or Friday afternoon is okay.         HISTORY OF PRESENT ILLNESS:    THYROID CANCER     Presentation: Multiple thyroid nodules in a background of Graves' disease status post FNA biopsy revealing papillary thyroid carcinoma, status post total thyroidectomy and central lymphadenectomy 11/9/2018 (pathology revealed multifocal papillary thyroid carcinoma involving the right lobe with the foci measuring 0.6 cm, 1.1 cm and 1.2 cm; negative margins, no angioinvasion,

## 2025-06-13 ENCOUNTER — OFFICE VISIT (OUTPATIENT)
Dept: PULMONOLOGY | Age: 60
End: 2025-06-13

## 2025-06-13 VITALS
DIASTOLIC BLOOD PRESSURE: 91 MMHG | HEART RATE: 79 BPM | TEMPERATURE: 98.2 F | BODY MASS INDEX: 28.11 KG/M2 | OXYGEN SATURATION: 97 % | WEIGHT: 189.8 LBS | SYSTOLIC BLOOD PRESSURE: 156 MMHG | RESPIRATION RATE: 18 BRPM | HEIGHT: 69 IN

## 2025-06-13 DIAGNOSIS — J45.30 MILD PERSISTENT ASTHMA WITHOUT COMPLICATION: Primary | ICD-10-CM

## 2025-06-13 DIAGNOSIS — J30.89 CHRONIC NON-SEASONAL ALLERGIC RHINITIS: ICD-10-CM

## 2025-06-13 LAB
EXPIRATORY TIME: NORMAL
FEF 25-75% %PRED-PRE: NORMAL
FEF 25-75% PRED: NORMAL
FEF 25-75-PRE: NORMAL
FEV1 %PRED-PRE: 95 %
FEV1 PRED: 2.57 L
FEV1/FVC %PRED-PRE: NORMAL
FEV1/FVC PRED: NORMAL
FEV1/FVC: 85 %
FEV1: 2.43 L
FVC %PRED-PRE: 88 %
FVC PRED: 3.26 L
FVC: 2.87 L
PEF %PRED-PRE: NORMAL
PEF PRED: NORMAL
PEF-PRE: NORMAL

## 2025-06-13 RX ORDER — FLUTICASONE FUROATE AND VILANTEROL TRIFENATATE 100; 25 UG/1; UG/1
POWDER RESPIRATORY (INHALATION)
Qty: 1 EACH | Refills: 11 | Status: SHIPPED | OUTPATIENT
Start: 2025-06-13

## 2025-06-13 RX ORDER — ALBUTEROL SULFATE 90 UG/1
INHALANT RESPIRATORY (INHALATION)
Qty: 18 G | Refills: 11 | Status: SHIPPED | OUTPATIENT
Start: 2025-06-13

## 2025-06-13 RX ORDER — FLUTICASONE PROPIONATE 50 MCG
2 SPRAY, SUSPENSION (ML) NASAL DAILY
Qty: 3 EACH | Refills: 3 | Status: SHIPPED | OUTPATIENT
Start: 2025-06-13

## 2025-06-13 RX ORDER — MONTELUKAST SODIUM 10 MG/1
TABLET ORAL
Qty: 90 TABLET | Refills: 3 | Status: SHIPPED | OUTPATIENT
Start: 2025-06-13

## 2025-06-13 ASSESSMENT — ENCOUNTER SYMPTOMS
COUGH: 0
HEMOPTYSIS: 0
SPUTUM PRODUCTION: 0
WHEEZING: 0
SHORTNESS OF BREATH: 1

## 2025-06-13 ASSESSMENT — PULMONARY FUNCTION TESTS
FEV1/FVC: 85
FVC: 2.87
FEV1_PERCENT_PREDICTED_PRE: 95
FVC_PERCENT_PREDICTED_PRE: 88
FEV1: 2.43
FEV1_PREDICTED: 2.57
FVC_PREDICTED: 3.26

## 2025-06-13 NOTE — PROGRESS NOTES
DIAGNOSTICS:       PFT's 7/08 per primary MD - restrictive defect.  Spirometry 8/08 - normal.  CXR 8/2008 - no acute cardiopulmonary process.  Labs 8/08 IgE level - 82. Rast level and eosinophil were unremarkable.    Methacholine challenge 9/08 - although not typical for positive criteria, there was a trend toward worsening and reversibility following bronchodilator.    Spirometry 1/09 -- normal, significant improvement with initiation of Advair and Singulair.  PFT's 11/09 - normal, improved.    Spirometry 12/20/2011 - normal, interval improvement.  PA and lateral CXR 12/20/11 - clear lung fields, no acute cardiopulmonary abnormality.  Spirometry 8/12 - normal.  Spirometry 7/26/13 - FVC is 2.87 L, FEV1 2.5 L, FEV1/FVC ratio 87%, interval decline in FVC and FEV1.  PA and lateral CXR 7/22/13-no acute abnormality.  Spirometry 7/10/2015 - normal, slight but statistically insignificant decline in FEV1.  Spirometry 7/5/2016 - normal, no significant change.  Spirometry 1/3/2018-normal, no significant interval change.  Spirometry 11/29/2018-mild restrictive defect, interval decline in FVC and FEV1.  Spirometry 6/7/2019- normal, interval improvement in FVC and FEV1.   Spirometry 7/19/2021-mild restrictive defect/borderline normal, interval decline in FVC and FEV1.  Spirometry 7/8/2022-normal, interval improvement in FVC and FEV1.  Spirometry 1/27/2023-normal, coughed during study.  Spirometry 7/13/2023-normal.  Spirometry 7/5/2024-normal.  
   Eflornithine HCl 13.9 % CREA Apply 1 each topically 2 times daily    hydroCHLOROthiazide (HYDRODIURIL) 25 MG tablet Take 1 tablet by mouth daily    hydrocortisone (HYTONE) 2.5 % lotion APPLY TO AFFECTED AREA TWICE A DAY FOR 7 DAYS    LORazepam (ATIVAN) 0.5 MG tablet 1 tablet at bedtime as needed    metoprolol succinate (TOPROL XL) 50 MG extended release tablet Take 1 tablet by mouth daily     No current facility-administered medications for this visit.       Over 50% of today's office visit was spent in face to face time reviewing test results, prognosis, importance of compliance, education about disease process, benefits of medications, instructions for management of acute symptoms, and follow up plans.     Electronically signed. Dictated using voice recognition software.  Proof read but unrecognized errors may exist.

## 2025-06-13 NOTE — PATIENT INSTRUCTIONS
She can hold Breo as long as she remains stable.  Discussed symptoms for which she should resume it, including increased need for albuterol inhaler.    Albuterol 2 puffs 4 times daily if needed for shortness of breath or wheezing.     Continue Singulair, Flonase as prescribed.     OTC Zyrtec or Claritin, 1 daily as needed.     OTC mucolytic, (mucinex or generic equivalent of guaifenesin), 2400mg in 24 hours in divided doses as needed to thin mucous.     Follow-up in 12 months with spirometry, sooner if needed.

## 2025-09-03 LAB
T4 FREE SERPL-MCNC: 1.64 NG/DL (ref 0.82–1.77)
TSH SERPL DL<=0.005 MIU/L-ACNC: 0.01 UIU/ML (ref 0.45–4.5)

## 2025-09-04 ENCOUNTER — OFFICE VISIT (OUTPATIENT)
Dept: ENDOCRINOLOGY | Age: 60
End: 2025-09-04
Payer: COMMERCIAL

## 2025-09-04 VITALS
BODY MASS INDEX: 26.77 KG/M2 | HEIGHT: 70 IN | OXYGEN SATURATION: 98 % | RESPIRATION RATE: 16 BRPM | HEART RATE: 72 BPM | WEIGHT: 187 LBS | DIASTOLIC BLOOD PRESSURE: 78 MMHG | SYSTOLIC BLOOD PRESSURE: 134 MMHG

## 2025-09-04 DIAGNOSIS — E07.9 THYROID EYE DISEASE: ICD-10-CM

## 2025-09-04 DIAGNOSIS — Z85.850 HISTORY OF THYROID CANCER: Primary | ICD-10-CM

## 2025-09-04 DIAGNOSIS — H57.89 THYROID EYE DISEASE: ICD-10-CM

## 2025-09-04 DIAGNOSIS — E89.0 POSTSURGICAL HYPOTHYROIDISM: ICD-10-CM

## 2025-09-04 PROCEDURE — 3078F DIAST BP <80 MM HG: CPT | Performed by: INTERNAL MEDICINE

## 2025-09-04 PROCEDURE — 3075F SYST BP GE 130 - 139MM HG: CPT | Performed by: INTERNAL MEDICINE

## 2025-09-04 PROCEDURE — 99214 OFFICE O/P EST MOD 30 MIN: CPT | Performed by: INTERNAL MEDICINE

## 2025-09-04 RX ORDER — CALCIUM CARB, CITRATE, MALATE 250 MG
1 CAPSULE ORAL DAILY
Qty: 1 CAPSULE | Refills: 0
Start: 2025-09-04

## 2025-09-04 RX ORDER — LEVOTHYROXINE SODIUM 175 MCG
TABLET ORAL
Qty: 90 TABLET | Refills: 3 | Status: SHIPPED | OUTPATIENT
Start: 2025-09-04

## 2025-09-04 ASSESSMENT — ENCOUNTER SYMPTOMS
CONSTIPATION: 0
DIARRHEA: 0
ROS SKIN COMMENTS: DENIES HAIR LOSS, DRY SKIN.

## (undated) DEVICE — SYR 3ML LL TIP 1/10ML GRAD --

## (undated) DEVICE — CANNULA NSL ORAL AD FOR CAPNOFLEX CO2 O2 AIRLFE

## (undated) DEVICE — SYR 5ML 1/5 GRAD LL NSAF LF --

## (undated) DEVICE — NDL PRT INJ NSAF BLNT 18GX1.5 --

## (undated) DEVICE — REM POLYHESIVE ADULT PATIENT RETURN ELECTRODE: Brand: VALLEYLAB

## (undated) DEVICE — KENDALL RADIOLUCENT FOAM MONITORING ELECTRODE RECTANGULAR SHAPE: Brand: KENDALL

## (undated) DEVICE — CONNECTOR TBNG OD5-7MM O2 END DISP

## (undated) DEVICE — SNARE POLYP SM W13MMXL240CM SHTH DIA2.4MM OVL FLX DISP

## (undated) DEVICE — CONTAINER PREFIL FRMLN 40ML --